# Patient Record
Sex: MALE | Race: WHITE | Employment: FULL TIME | URBAN - METROPOLITAN AREA
[De-identification: names, ages, dates, MRNs, and addresses within clinical notes are randomized per-mention and may not be internally consistent; named-entity substitution may affect disease eponyms.]

---

## 2017-02-26 DIAGNOSIS — J30.9 ALLERGIC RHINITIS, UNSPECIFIED ALLERGIC RHINITIS TRIGGER, UNSPECIFIED RHINITIS SEASONALITY: Primary | ICD-10-CM

## 2017-02-27 RX ORDER — LEVOCETIRIZINE DIHYDROCHLORIDE 5 MG/1
TABLET, FILM COATED ORAL
Qty: 30 TABLET | Refills: 3 | Status: SHIPPED | OUTPATIENT
Start: 2017-02-27 | End: 2017-06-27

## 2017-06-17 ENCOUNTER — OFFICE VISIT (OUTPATIENT)
Dept: FAMILY MEDICINE CLINIC | Facility: CLINIC | Age: 42
End: 2017-06-17

## 2017-06-17 VITALS
TEMPERATURE: 100 F | SYSTOLIC BLOOD PRESSURE: 110 MMHG | OXYGEN SATURATION: 98 % | WEIGHT: 280 LBS | DIASTOLIC BLOOD PRESSURE: 80 MMHG | HEART RATE: 110 BPM | BODY MASS INDEX: 40.09 KG/M2 | HEIGHT: 70 IN

## 2017-06-17 DIAGNOSIS — J01.00 ACUTE NON-RECURRENT MAXILLARY SINUSITIS: ICD-10-CM

## 2017-06-17 DIAGNOSIS — R05.9 COUGH: Primary | ICD-10-CM

## 2017-06-17 PROCEDURE — 99213 OFFICE O/P EST LOW 20 MIN: CPT | Performed by: FAMILY MEDICINE

## 2017-06-17 RX ORDER — BENZONATATE 100 MG/1
200 CAPSULE ORAL 3 TIMES DAILY PRN
Qty: 30 CAPSULE | Refills: 1 | Status: SHIPPED | OUTPATIENT
Start: 2017-06-17 | End: 2018-05-25 | Stop reason: ALTCHOICE

## 2017-06-17 RX ORDER — PREDNISONE 20 MG/1
TABLET ORAL
Qty: 10 TABLET | Refills: 0 | Status: SHIPPED | OUTPATIENT
Start: 2017-06-17 | End: 2018-05-25 | Stop reason: ALTCHOICE

## 2017-06-17 RX ORDER — CEFDINIR 300 MG/1
300 CAPSULE ORAL 2 TIMES DAILY
Qty: 20 CAPSULE | Refills: 0 | Status: SHIPPED | OUTPATIENT
Start: 2017-06-17 | End: 2018-05-25 | Stop reason: ALTCHOICE

## 2017-06-17 NOTE — PATIENT INSTRUCTIONS
Take antibiotics with food and plenty of water. Eat yogurt or take probiotic daily. (Ok Maria Teresa is a good example of an OTC probiotic)  Make sure to finish the entire antibiotic treatment.     Use inhaler for spasmodic cough or shortness of breath-- 2 puffs ev

## 2017-06-17 NOTE — PROGRESS NOTES
CHIEF COMPLAINT:   Patient presents with:  Cough/URI: cough for 3 days, fever since day 1-- 99.5 at max-- 99.3 this am. cough dry, but productive this am. thick yellow mucous. couldn't breathe well last night when lying down. nasal congestion, sore throat. reviewed. No pertinent past medical history.    Social History:    Smoking Status: Never Smoker                      Smokeless Status: Never Used                        Alcohol Use: Yes           0.0 - 0.6 oz/week       0-1 Standard drinks or equivalent per benzonatate (TESSALON PERLES) 100 MG Oral Cap 30 capsule 1      Sig: Take 2 capsules (200 mg total) by mouth 3 (three) times daily as needed for cough. Patient Instructions   Take antibiotics with food and plenty of water.    Eat yogurt or take

## 2017-06-27 ENCOUNTER — OFFICE VISIT (OUTPATIENT)
Dept: FAMILY MEDICINE CLINIC | Facility: CLINIC | Age: 42
End: 2017-06-27

## 2017-06-27 VITALS
BODY MASS INDEX: 40.37 KG/M2 | HEIGHT: 70.5 IN | HEART RATE: 88 BPM | TEMPERATURE: 98 F | WEIGHT: 285.19 LBS | SYSTOLIC BLOOD PRESSURE: 126 MMHG | DIASTOLIC BLOOD PRESSURE: 56 MMHG | RESPIRATION RATE: 18 BRPM

## 2017-06-27 DIAGNOSIS — J45.20 EXTRINSIC ASTHMA, MILD INTERMITTENT, UNCOMPLICATED: ICD-10-CM

## 2017-06-27 DIAGNOSIS — J01.00 ACUTE MAXILLARY SINUSITIS, RECURRENCE NOT SPECIFIED: Primary | ICD-10-CM

## 2017-06-27 DIAGNOSIS — J30.9 ALLERGIC RHINITIS, UNSPECIFIED ALLERGIC RHINITIS TRIGGER, UNSPECIFIED RHINITIS SEASONALITY: ICD-10-CM

## 2017-06-27 DIAGNOSIS — J20.9 ACUTE BRONCHITIS, UNSPECIFIED ORGANISM: ICD-10-CM

## 2017-06-27 PROCEDURE — 99213 OFFICE O/P EST LOW 20 MIN: CPT | Performed by: FAMILY MEDICINE

## 2017-06-27 RX ORDER — MOMETASONE 50 UG/1
2 SPRAY, METERED NASAL DAILY
Qty: 1 INHALER | Refills: 5 | Status: SHIPPED | OUTPATIENT
Start: 2017-06-27 | End: 2018-05-25

## 2017-06-27 RX ORDER — AZITHROMYCIN 250 MG/1
TABLET, FILM COATED ORAL
Qty: 6 TABLET | Refills: 0 | Status: SHIPPED | OUTPATIENT
Start: 2017-06-27 | End: 2018-05-25 | Stop reason: ALTCHOICE

## 2017-06-27 RX ORDER — LEVOCETIRIZINE DIHYDROCHLORIDE 5 MG/1
5 TABLET, FILM COATED ORAL
Qty: 30 TABLET | Refills: 5 | Status: SHIPPED | OUTPATIENT
Start: 2017-06-27 | End: 2018-02-08

## 2017-06-27 RX ORDER — ALBUTEROL SULFATE 90 UG/1
2 AEROSOL, METERED RESPIRATORY (INHALATION) EVERY 4 HOURS PRN
Qty: 1 INHALER | Refills: 6 | Status: SHIPPED | OUTPATIENT
Start: 2017-06-27 | End: 2019-11-11

## 2017-06-27 NOTE — PROGRESS NOTES
Keo Atkinson is a 35year old male. S:  Patient presents today with the following concerns:  · Patient complains of cough and congestion. Had albuterol inhaler at home. Used that and thought helped with cough spasm.   Liz wilsno helped with cou of breath with exertion  CARDIOVASCULAR: denies chest pain on exertion  GI: denies abdominal pain.   No N/V/D/C  : denies dysuria  MUSCULOSKELETAL: denies back pain  NEURO: denies headaches    EXAM:  /56 (BP Location: Left arm, Patient Position: Sit Recommend Z-josefina as directed. Start the prednisone that was given at the [facility]. Refilled Albuterol, levocetirizine, Nasonex. Encouraged fluids, rest, and steam.    Follow up if symptoms worsen or do not improve.

## 2017-09-25 ENCOUNTER — TELEPHONE (OUTPATIENT)
Dept: FAMILY MEDICINE CLINIC | Facility: CLINIC | Age: 42
End: 2017-09-25

## 2018-02-08 ENCOUNTER — TELEPHONE (OUTPATIENT)
Dept: FAMILY MEDICINE CLINIC | Facility: CLINIC | Age: 43
End: 2018-02-08

## 2018-02-08 RX ORDER — LEVOCETIRIZINE DIHYDROCHLORIDE 5 MG/1
5 TABLET, FILM COATED ORAL
Qty: 30 TABLET | Refills: 0 | Status: SHIPPED | OUTPATIENT
Start: 2018-02-08 | End: 2018-05-25

## 2018-02-09 ENCOUNTER — TELEPHONE (OUTPATIENT)
Dept: FAMILY MEDICINE CLINIC | Facility: CLINIC | Age: 43
End: 2018-02-09

## 2018-02-09 NOTE — TELEPHONE ENCOUNTER
Eloy sent fax stating Parveen Schaffer is on back order. Is there a alternate med you would temporarily order in it's place ? Routed to [employer]

## 2018-02-11 NOTE — TELEPHONE ENCOUNTER
I would just have him take Zyrtec 10 mg once daily til available. He could also check other pharmacies.

## 2018-02-13 NOTE — TELEPHONE ENCOUNTER
Called and talked to patient informed him of back order and what to take until this is available again

## 2018-02-14 NOTE — TELEPHONE ENCOUNTER
Called patient and left message that he is due for an asthma follow up and to contact the office to schedule

## 2018-05-25 ENCOUNTER — OFFICE VISIT (OUTPATIENT)
Dept: FAMILY MEDICINE CLINIC | Facility: CLINIC | Age: 43
End: 2018-05-25

## 2018-05-25 VITALS
WEIGHT: 289.19 LBS | TEMPERATURE: 99 F | BODY MASS INDEX: 41.4 KG/M2 | OXYGEN SATURATION: 98 % | DIASTOLIC BLOOD PRESSURE: 86 MMHG | SYSTOLIC BLOOD PRESSURE: 128 MMHG | HEIGHT: 70 IN | HEART RATE: 86 BPM

## 2018-05-25 DIAGNOSIS — J01.40 ACUTE NON-RECURRENT PANSINUSITIS: Primary | ICD-10-CM

## 2018-05-25 DIAGNOSIS — J30.1 NON-SEASONAL ALLERGIC RHINITIS DUE TO POLLEN: ICD-10-CM

## 2018-05-25 PROCEDURE — 99214 OFFICE O/P EST MOD 30 MIN: CPT | Performed by: FAMILY MEDICINE

## 2018-05-25 RX ORDER — AMOXICILLIN AND CLAVULANATE POTASSIUM 875; 125 MG/1; MG/1
1 TABLET, FILM COATED ORAL 2 TIMES DAILY
Qty: 20 TABLET | Refills: 0 | Status: SHIPPED | OUTPATIENT
Start: 2018-05-25 | End: 2018-06-04

## 2018-05-25 RX ORDER — MOMETASONE 50 UG/1
2 SPRAY, METERED NASAL DAILY
Qty: 1 INHALER | Refills: 5 | Status: SHIPPED | OUTPATIENT
Start: 2018-05-25 | End: 2019-11-11

## 2018-05-25 RX ORDER — PREDNISONE 20 MG/1
40 TABLET ORAL DAILY
Qty: 10 TABLET | Refills: 0 | Status: SHIPPED | OUTPATIENT
Start: 2018-05-25 | End: 2018-05-30

## 2018-05-25 RX ORDER — LEVOCETIRIZINE DIHYDROCHLORIDE 5 MG/1
5 TABLET, FILM COATED ORAL
Qty: 30 TABLET | Refills: 5 | Status: SHIPPED | OUTPATIENT
Start: 2018-05-25 | End: 2019-04-23

## 2018-05-25 NOTE — PROGRESS NOTES
Chief Complaint:  Patient presents with:  Cough: Feeling run down for over a week with a cough keeping him awake at night,facial sinus pressure. Lite yellow nasal drainage. Tight chest from cough. SOB lying down.   Asthma: ACT Score 22/25             AAP upd Smokeless tobacco: Never Used                      Alcohol use: Yes           0.6 oz/week     Glasses of wine: 1 per week     Comment: 2-3 drinks a month         Current Outpatient Prescriptions:  Mometasone Furoate (NASONEX) 50 MCG/ACT Nasal Suspensio 875-125 MG Oral Tab    Non-seasonal allergic rhinitis due to pollen        Relevant Medications    Mometasone Furoate (NASONEX) 50 MCG/ACT Nasal Suspension    Levocetirizine Dihydrochloride 5 MG Oral Tab    predniSONE 20 MG Oral Tab          Advised the fo

## 2018-09-12 ENCOUNTER — OFFICE VISIT (OUTPATIENT)
Dept: FAMILY MEDICINE CLINIC | Facility: CLINIC | Age: 43
End: 2018-09-12
Payer: COMMERCIAL

## 2018-09-12 VITALS
SYSTOLIC BLOOD PRESSURE: 122 MMHG | RESPIRATION RATE: 16 BRPM | TEMPERATURE: 100 F | DIASTOLIC BLOOD PRESSURE: 80 MMHG | HEART RATE: 98 BPM

## 2018-09-12 DIAGNOSIS — M10.9 ACUTE GOUT OF RIGHT FOOT, UNSPECIFIED CAUSE: Primary | ICD-10-CM

## 2018-09-12 PROCEDURE — 99213 OFFICE O/P EST LOW 20 MIN: CPT | Performed by: NURSE PRACTITIONER

## 2018-09-12 RX ORDER — PREDNISONE 10 MG/1
10 TABLET ORAL DAILY
Qty: 27 TABLET | Refills: 0 | Status: SHIPPED | OUTPATIENT
Start: 2018-09-12 | End: 2018-10-13 | Stop reason: ALTCHOICE

## 2018-09-12 NOTE — PATIENT INSTRUCTIONS
Prednisone 10mg tabs- take as below (take all tabs for the day in the morning, at the same time): Take 4 tabs daily for 3 days, take 3 tabs daily for 3 days, take 2 tabs daily for 2 days, take 1 tab daily for 2 days.     Do not take any Adv increased risk for a gout attack, but recent studies show that high purine vegetables don't increase the risk for a gout attack. Eat more of these foods  Other foods may be helpful for people with gout.  Add some of these foods to your diet:  · Cherries co

## 2018-09-12 NOTE — PROGRESS NOTES
Patient presents with: Foot Pain: on Right foot, cannot put weight on it, has an appt with podiatrist tomorrow       HPI:  Presents with approx 4 day history of right foot redness, swelling and pain over great toe joint. Denies known injury to foot.  Lio Brian Ext: right foot over first metatarsophalangeal joint erythematous, edematous and TTP. No open lesions or sores. No streaking of redness up foot. Limited ROM due to pain.      A/P:    Acute gout of right foot, unspecified cause  (primary encounter diagnosis) Weight loss for those who are overweight may help reduce gout attacks. Eat less of these foods  Eating too many foods containing purines may raise the levels of uric acid in your body. This raises your risk for a gout attack.  Try to limit these foods and · Fever of 100.4°F (38°C) or higher  · Pain that doesn't get better even with prescribed medicine   Date Last Reviewed: 1/12/2016  © 7318-7287 [employer]. [address] [facility], [address] [place] [address]. All rights reserved.  This information is no

## 2018-10-13 ENCOUNTER — OFFICE VISIT (OUTPATIENT)
Dept: FAMILY MEDICINE CLINIC | Facility: CLINIC | Age: 43
End: 2018-10-13
Payer: COMMERCIAL

## 2018-10-13 VITALS
HEART RATE: 80 BPM | BODY MASS INDEX: 40.49 KG/M2 | DIASTOLIC BLOOD PRESSURE: 84 MMHG | RESPIRATION RATE: 16 BRPM | SYSTOLIC BLOOD PRESSURE: 130 MMHG | TEMPERATURE: 99 F | WEIGHT: 286 LBS | HEIGHT: 70.5 IN

## 2018-10-13 DIAGNOSIS — M10.9 ACUTE GOUT OF RIGHT FOOT, UNSPECIFIED CAUSE: Primary | ICD-10-CM

## 2018-10-13 DIAGNOSIS — J01.40 ACUTE NON-RECURRENT PANSINUSITIS: ICD-10-CM

## 2018-10-13 DIAGNOSIS — R05.9 COUGH: ICD-10-CM

## 2018-10-13 PROCEDURE — 99213 OFFICE O/P EST LOW 20 MIN: CPT | Performed by: FAMILY MEDICINE

## 2018-10-13 RX ORDER — AMOXICILLIN AND CLAVULANATE POTASSIUM 875; 125 MG/1; MG/1
1 TABLET, FILM COATED ORAL 2 TIMES DAILY
Qty: 14 TABLET | Refills: 0 | Status: SHIPPED | OUTPATIENT
Start: 2018-10-13 | End: 2018-10-20

## 2018-10-13 NOTE — PATIENT INSTRUCTIONS
Gout Diet  Gout is a painful condition caused by an excess of uric acid, a waste product made by the body. Uric acid forms crystals that collect in the joints. The immune response to these crystals brings on symptoms of joint pain and swelling.  This is c · Dairy products that are low-fat or fat-free, such as cheese and yogurt  · Complex carbohydrate foods, including whole grains, brown rice, oats, and beans  · Coffee, in moderation  · Water, approximately 64 ounces per day  Follow-up care  Follow up with carlos a

## 2018-10-13 NOTE — PROGRESS NOTES
Patient presents with:  Cough: pain in side when cough x 1 week   Gout     HPI:   Gale Bryan is a 37year old male who presents to the office for eval of cough, and a new diagnosis of gout. Cough - started after gout.   Present about a week and a h times daily for 7 days. Dispense: 14 tablet; Refill: 0    3. Cough      Nyla Hendrickson M.D.   [facility] 3  10/13/18    Labs: was on steroids during lab draw.

## 2018-10-24 ENCOUNTER — TELEPHONE (OUTPATIENT)
Dept: FAMILY MEDICINE CLINIC | Facility: CLINIC | Age: 43
End: 2018-10-24

## 2018-10-24 RX ORDER — LEVOFLOXACIN 750 MG/1
750 TABLET ORAL DAILY
Qty: 7 TABLET | Refills: 0 | Status: SHIPPED | OUTPATIENT
Start: 2018-10-24 | End: 2018-10-31

## 2018-10-24 NOTE — TELEPHONE ENCOUNTER
Pt wife called stating Pt continues with cough, headache, sinus pressure, teeth pain and snoring. Pt was Dx with sinus infection on 10/13/18- started on Augmentin. Pt symptoms improved some while on antibiotics but now symptoms back completely.  Pt taking

## 2018-10-24 NOTE — TELEPHONE ENCOUNTER
Pt was seen on 10/13/18 by Dr Ana Treviño  and was prescribed Augmentin. Pt still having symptoms.  [facility]

## 2018-10-24 NOTE — TELEPHONE ENCOUNTER
Lets try a different antibiotic to get these symptoms under better control  Levaquin x 7 days  ordered

## 2019-01-07 ENCOUNTER — OFFICE VISIT (OUTPATIENT)
Dept: FAMILY MEDICINE CLINIC | Facility: CLINIC | Age: 44
End: 2019-01-07
Payer: COMMERCIAL

## 2019-01-07 VITALS
HEART RATE: 84 BPM | RESPIRATION RATE: 16 BRPM | WEIGHT: 284.19 LBS | BODY MASS INDEX: 39.79 KG/M2 | DIASTOLIC BLOOD PRESSURE: 88 MMHG | SYSTOLIC BLOOD PRESSURE: 128 MMHG | TEMPERATURE: 99 F | HEIGHT: 71 IN

## 2019-01-07 DIAGNOSIS — M10.9 ACUTE GOUT OF LEFT FOOT, UNSPECIFIED CAUSE: Primary | ICD-10-CM

## 2019-01-07 PROCEDURE — 99213 OFFICE O/P EST LOW 20 MIN: CPT | Performed by: FAMILY MEDICINE

## 2019-01-07 RX ORDER — PREDNISONE 10 MG/1
TABLET ORAL
Qty: 27 TABLET | Refills: 0 | Status: SHIPPED | OUTPATIENT
Start: 2019-01-07 | End: 2019-11-11

## 2019-01-07 NOTE — PATIENT INSTRUCTIONS
Gout Diet  Gout is a painful condition caused by an excess of uric acid, a waste product made by the body. Uric acid forms crystals that collect in the joints. The immune response to these crystals brings on symptoms of joint pain and swelling.  This is c · Dairy products that are low-fat or fat-free, such as cheese and yogurt  · Complex carbohydrate foods, including whole grains, brown rice, oats, and beans  · Coffee, in moderation  · Water, approximately 64 ounces per day  Follow-up care  Follow up with carlos a · Certain fish (anchovies, sardines, fish roes, herring, tuna, mussels, codfish, scallops, trout, and shayy)  · Certain meats (red meat, processed meat, hart, turkey, wild game, and goose)  · Sauces and gravies made with meat  · Organ meats (such as kayla Gout is an inflammation of a joint due to a build-up of gout crystals in the joint fluid. This occurs when there is an excess of uric acid (a normal waste product) in the body.  Uric acid builds up in the body when the kidneys are unable to filter enough of · If pain medicines have been prescribed, take them exactly as directed.    Preventing attacks  · Minimize or avoid alcohol use. Excess alcohol intake can cause a gout attack. · Limit these foods and beverages:  ? Organ meats, such as kidneys and liver  ?

## 2019-01-08 NOTE — PROGRESS NOTES
Zhou Zuniga is a 37year old male. S:  Patient presents today with the following concerns:  · Symptoms began 4 days with left foot pain, swelling, redness and heat. Feels like a gouty attack to him. Has had a few the past several months.   Unable t denies shortness of breath with exertion  CARDIOVASCULAR: denies chest pain on exertion  GI: denies abdominal pain.   No N/V/D/C  : denies dysuria  MUSCULOSKELETAL: denies back pain  NEURO: denies headaches    EXAM:  /88   Pulse 84   Temp 98.9 °F (3

## 2019-04-23 DIAGNOSIS — J30.1 NON-SEASONAL ALLERGIC RHINITIS DUE TO POLLEN: ICD-10-CM

## 2019-04-24 RX ORDER — LEVOCETIRIZINE DIHYDROCHLORIDE 5 MG/1
TABLET, FILM COATED ORAL
Qty: 30 TABLET | Refills: 5 | Status: SHIPPED | OUTPATIENT
Start: 2019-04-24 | End: 2019-11-11

## 2019-11-11 ENCOUNTER — OFFICE VISIT (OUTPATIENT)
Dept: FAMILY MEDICINE CLINIC | Facility: CLINIC | Age: 44
End: 2019-11-11
Payer: COMMERCIAL

## 2019-11-11 VITALS
BODY MASS INDEX: 41.05 KG/M2 | TEMPERATURE: 98 F | HEART RATE: 84 BPM | RESPIRATION RATE: 16 BRPM | DIASTOLIC BLOOD PRESSURE: 88 MMHG | WEIGHT: 290 LBS | SYSTOLIC BLOOD PRESSURE: 128 MMHG | HEIGHT: 70.5 IN

## 2019-11-11 DIAGNOSIS — J45.20 EXTRINSIC ASTHMA, MILD INTERMITTENT, UNCOMPLICATED: ICD-10-CM

## 2019-11-11 DIAGNOSIS — R79.89 LOW TESTOSTERONE: ICD-10-CM

## 2019-11-11 DIAGNOSIS — J01.10 ACUTE NON-RECURRENT FRONTAL SINUSITIS: Primary | ICD-10-CM

## 2019-11-11 DIAGNOSIS — J30.1 NON-SEASONAL ALLERGIC RHINITIS DUE TO POLLEN: ICD-10-CM

## 2019-11-11 DIAGNOSIS — E55.9 VITAMIN D DEFICIENCY: ICD-10-CM

## 2019-11-11 DIAGNOSIS — E78.2 MIXED HYPERLIPIDEMIA: ICD-10-CM

## 2019-11-11 PROCEDURE — 99214 OFFICE O/P EST MOD 30 MIN: CPT | Performed by: FAMILY MEDICINE

## 2019-11-11 RX ORDER — ALBUTEROL SULFATE 90 UG/1
2 AEROSOL, METERED RESPIRATORY (INHALATION) EVERY 4 HOURS PRN
Qty: 1 INHALER | Refills: 6 | Status: SHIPPED | OUTPATIENT
Start: 2019-11-11 | End: 2021-10-27

## 2019-11-11 RX ORDER — AMOXICILLIN AND CLAVULANATE POTASSIUM 875; 125 MG/1; MG/1
1 TABLET, FILM COATED ORAL 2 TIMES DAILY
Qty: 20 TABLET | Refills: 0 | Status: SHIPPED | OUTPATIENT
Start: 2019-11-11 | End: 2019-11-21

## 2019-11-11 RX ORDER — ERGOCALCIFEROL 1.25 MG/1
50000 CAPSULE ORAL WEEKLY
Qty: 12 CAPSULE | Refills: 1 | Status: SHIPPED | OUTPATIENT
Start: 2019-11-11 | End: 2019-12-11

## 2019-11-11 RX ORDER — MOMETASONE 50 UG/1
2 SPRAY, METERED NASAL DAILY
Qty: 1 INHALER | Refills: 11 | Status: SHIPPED | OUTPATIENT
Start: 2019-11-11 | End: 2021-08-05

## 2019-11-11 RX ORDER — LEVOCETIRIZINE DIHYDROCHLORIDE 5 MG/1
TABLET, FILM COATED ORAL
Qty: 30 TABLET | Refills: 11 | Status: SHIPPED | OUTPATIENT
Start: 2019-11-11 | End: 2021-08-05

## 2019-11-11 NOTE — PROGRESS NOTES
Eran Santizo is a 53year old male.     S:  Patient presents today with the following concerns:  Sinus Problem (x 4 weeks, sinus congestion pressure getting worse)   Cough (dry cough)   Allergies (needs follow up for allergy medication refills)     · Fro N/V/D/C  : denies dysuria  MUSCULOSKELETAL: denies back pain  NEURO:see above    EXAM:  /88   Pulse 84   Temp 98.1 °F (36.7 °C) (Oral)   Resp 16   Ht 70.5\"   Wt 290 lb (131.5 kg)   BMI 41.02 kg/m²   GENERAL: well developed, well nourished,in no ap (INTERNAL)      Meds refilled as above. Start Augmentin for sinusitis. Restart allergy meds. Increase fluids, steam, rest.  Sinus rinses or saline nasal sprays or netti pot. Tea with honey. Warm compresses to the sinuses. May use Sudafed if no HTN.

## 2019-12-14 ENCOUNTER — TELEPHONE (OUTPATIENT)
Dept: FAMILY MEDICINE CLINIC | Facility: CLINIC | Age: 44
End: 2019-12-14

## 2019-12-14 NOTE — TELEPHONE ENCOUNTER
Wife called back explained that I sent message to [facility] Mellisa.  If she answers by end of day I will call back otherwise will call on Monday

## 2019-12-14 NOTE — TELEPHONE ENCOUNTER
Patient's wife is calling stating patient has completed course of antibiotics and today patient is feeling worse. Symptoms have not cleared and would like to know what to do.

## 2019-12-14 NOTE — TELEPHONE ENCOUNTER
I saw him 11/11/19. Did the symptoms go away and then return OR have symptoms continued? Please call patient to clarify.

## 2019-12-16 RX ORDER — BENZONATATE 200 MG/1
200 CAPSULE ORAL 3 TIMES DAILY PRN
Qty: 30 CAPSULE | Refills: 0 | Status: SHIPPED | OUTPATIENT
Start: 2019-12-16 | End: 2021-08-05

## 2019-12-16 RX ORDER — DOXYCYCLINE HYCLATE 100 MG
100 TABLET ORAL 2 TIMES DAILY
Qty: 20 TABLET | Refills: 0 | Status: SHIPPED | OUTPATIENT
Start: 2019-12-16 | End: 2020-11-06

## 2019-12-16 NOTE — TELEPHONE ENCOUNTER
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

## 2019-12-18 ENCOUNTER — HOSPITAL ENCOUNTER (OUTPATIENT)
Dept: CT IMAGING | Facility: HOSPITAL | Age: 44
Discharge: HOME | End: 2019-12-18
Attending: FAMILY MEDICINE
Payer: COMMERCIAL

## 2019-12-18 ENCOUNTER — OFFICE VISIT (OUTPATIENT)
Dept: FAMILY MEDICINE CLINIC | Facility: CLINIC | Age: 44
End: 2019-12-18
Payer: COMMERCIAL

## 2019-12-18 VITALS
HEART RATE: 84 BPM | TEMPERATURE: 99 F | WEIGHT: 286.19 LBS | BODY MASS INDEX: 40.97 KG/M2 | HEIGHT: 70 IN | DIASTOLIC BLOOD PRESSURE: 80 MMHG | RESPIRATION RATE: 16 BRPM | SYSTOLIC BLOOD PRESSURE: 138 MMHG

## 2019-12-18 DIAGNOSIS — N20.1 RIGHT URETERAL STONE: ICD-10-CM

## 2019-12-18 DIAGNOSIS — R10.9 ACUTE RIGHT FLANK PAIN: ICD-10-CM

## 2019-12-18 DIAGNOSIS — R31.29 MICROSCOPIC HEMATURIA: ICD-10-CM

## 2019-12-18 DIAGNOSIS — R35.0 URINARY FREQUENCY: ICD-10-CM

## 2019-12-18 DIAGNOSIS — R10.9 ACUTE RIGHT FLANK PAIN: Primary | ICD-10-CM

## 2019-12-18 PROBLEM — Q66.229 METATARSUS ADDUCTUS: Status: ACTIVE | Noted: 2019-12-18

## 2019-12-18 PROBLEM — M19.079 OSTEOARTHRITIS OF FOOT JOINT: Status: ACTIVE | Noted: 2019-12-18

## 2019-12-18 PROCEDURE — 99214 OFFICE O/P EST MOD 30 MIN: CPT | Performed by: FAMILY MEDICINE

## 2019-12-18 PROCEDURE — 81003 URINALYSIS AUTO W/O SCOPE: CPT | Performed by: FAMILY MEDICINE

## 2019-12-18 PROCEDURE — 74176 CT ABD & PELVIS W/O CONTRAST: CPT | Performed by: FAMILY MEDICINE

## 2019-12-18 RX ORDER — TAMSULOSIN HYDROCHLORIDE 0.4 MG/1
0.4 CAPSULE ORAL DAILY
Qty: 30 CAPSULE | Refills: 0 | Status: SHIPPED | OUTPATIENT
Start: 2019-12-18 | End: 2020-01-17

## 2019-12-18 RX ORDER — HYDROCODONE BITARTRATE AND ACETAMINOPHEN 5; 325 MG/1; MG/1
1 TABLET ORAL EVERY 6 HOURS PRN
Qty: 10 TABLET | Refills: 0 | Status: SHIPPED | OUTPATIENT
Start: 2019-12-18 | End: 2021-08-05

## 2019-12-18 NOTE — PATIENT INSTRUCTIONS
Preventing Kidney Stones  If you’ve had a kidney stone, you may worry that you’ll have another. Removing or passing your stone doesn’t prevent future stones. But with your healthcare provider’s help, you can reduce your risk of forming new stones.  Follow For uric acid stones: Limit high-purine foods, such as mushrooms, peas, beans, anchovies, meat, poultry, shellfish, and organ meats. These foods increase uric acid production.   For cystine stones: Limit high-methionine foods (fish is the most common, but e Follow up with your healthcare provider  Follow up by taking any stones you find to your provider for analysis. The type of stone you have determines your diet and prevention program. You may need more tests in the future.  These tests will ensure that new Follow up by taking any stones you find to your provider for analysis. The type of stone you have determines your diet and prevention program. You may need more tests in the future. These tests will ensure that new stones are not forming.   Date Last Review

## 2019-12-18 NOTE — PROGRESS NOTES
Stephen Hayes is a 53year old male. S:  Patient presents today with the following concerns:  Cough (Currently on antibiotic and cough medication)   Flank Pain (R intense flank pain. beginning at 2:30a.m. this morning. Managing pain with ibuprofen.  Henry joint     Metatarsus adductus    Family History   Problem Relation Age of Onset   • Arthritis Maternal Grandmother    • Arthritis Mother    • Diabetes Maternal Grandmother    • Diabetes Mother        REVIEW OF SYSTEMS:  GENERAL: feels well otherwise  SKIN: 2-3 ibuprofen and 2 tylenol at the same time for pain relief. Small quantity of Norco given to take only if needed. Push fluids. Urine strainer given to him. Strain urine every time so can see if passes the stone. Will send stone to path if passes.   I

## 2019-12-19 ENCOUNTER — TELEPHONE (OUTPATIENT)
Dept: FAMILY MEDICINE CLINIC | Facility: CLINIC | Age: 44
End: 2019-12-19

## 2019-12-19 NOTE — TELEPHONE ENCOUNTER
Please call patient to see how he is doing this afternoon. Kidney stone diagnosed yesterday. On Flomax and straining urine.

## 2019-12-20 NOTE — TELEPHONE ENCOUNTER
Patient has not passed stone yet. Tolerating pain with the help of ibuprofen and APAP. Encouraged patient to continue Flomax and straining urine. Take pain meds PRN. If pain increases pt should go to ER. He verbalizes understanding.     Routed to [facility] No

## 2019-12-27 ENCOUNTER — TELEPHONE (OUTPATIENT)
Dept: FAMILY MEDICINE CLINIC | Facility: CLINIC | Age: 44
End: 2019-12-27

## 2019-12-27 DIAGNOSIS — N20.0 KIDNEY STONE: Primary | ICD-10-CM

## 2019-12-28 NOTE — TELEPHONE ENCOUNTER
Called and talked to patient he has passed one stone and has it in a jar at home. Wife brought it in but no one knew what to do with it. He is now pain free and wants to stop straining until he startes having pain again.  I told this was ok and I would ask

## 2019-12-28 NOTE — TELEPHONE ENCOUNTER
Placed order for pathology to examine stone then called patient and told him OK to stop straining urine and to take stone to lab that order was already in for this

## 2020-08-24 ENCOUNTER — TELEPHONE (OUTPATIENT)
Dept: FAMILY MEDICINE CLINIC | Facility: CLINIC | Age: 45
End: 2020-08-24

## 2020-08-24 DIAGNOSIS — J30.1 SEASONAL ALLERGIC RHINITIS DUE TO POLLEN: Primary | ICD-10-CM

## 2020-08-24 NOTE — TELEPHONE ENCOUNTER
Patient's wife is calling he needs a note for work stating he has chronic allergies/congestion before they will let him go to work on Thursday, 8/27/20. Please call for details.

## 2020-09-22 ENCOUNTER — TELEPHONE (OUTPATIENT)
Dept: FAMILY MEDICINE CLINIC | Facility: CLINIC | Age: 45
End: 2020-09-22

## 2020-09-22 NOTE — TELEPHONE ENCOUNTER
Pt had flu shot and ever since he hasnt felt right. He is getting a little worse with the symptoms. He has some dizziness and headache. Wife wondering if it is something they should be concerned about.

## 2020-09-22 NOTE — TELEPHONE ENCOUNTER
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  I told the wife to have him drink lots of fluids and take tylenol if not better tomorrow might need to

## 2020-11-03 ENCOUNTER — VIRTUAL PHONE E/M (OUTPATIENT)
Dept: FAMILY MEDICINE CLINIC | Facility: CLINIC | Age: 45
End: 2020-11-03
Payer: COMMERCIAL

## 2020-11-03 DIAGNOSIS — Z20.822 ENCOUNTER FOR SCREENING LABORATORY TESTING FOR COVID-19 VIRUS: ICD-10-CM

## 2020-11-03 DIAGNOSIS — R09.81 NASAL CONGESTION: Primary | ICD-10-CM

## 2020-11-03 PROCEDURE — 99213 OFFICE O/P EST LOW 20 MIN: CPT | Performed by: PHYSICIAN ASSISTANT

## 2020-11-03 NOTE — PROGRESS NOTES
Virtual Telephone Check-In    Elva Turner verbally consents to a Virtual/Telephone Check-In visit on 11/03/20. Patient has been referred to the [facility] website at [email or web address] to review the yearly Consent to Treat document.     Patient underst

## 2020-11-04 ENCOUNTER — LAB ENCOUNTER (OUTPATIENT)
Dept: LAB | Facility: HOSPITAL | Age: 45
End: 2020-11-04
Attending: PHYSICIAN ASSISTANT
Payer: COMMERCIAL

## 2020-11-04 DIAGNOSIS — R09.81 SINUS CONGESTION: Primary | ICD-10-CM

## 2021-02-11 DIAGNOSIS — Z23 NEED FOR VACCINATION: ICD-10-CM

## 2021-02-14 ENCOUNTER — IMMUNIZATION (OUTPATIENT)
Dept: LAB | Age: 46
End: 2021-02-14
Attending: HOSPITALIST
Payer: COMMERCIAL

## 2021-02-14 DIAGNOSIS — Z23 NEED FOR VACCINATION: Primary | ICD-10-CM

## 2021-02-14 PROCEDURE — 0001A SARSCOV2 VAC 30MCG/0.3ML IM: CPT

## 2021-03-07 ENCOUNTER — IMMUNIZATION (OUTPATIENT)
Dept: LAB | Age: 46
End: 2021-03-07
Attending: HOSPITALIST
Payer: COMMERCIAL

## 2021-03-07 DIAGNOSIS — Z23 NEED FOR VACCINATION: Primary | ICD-10-CM

## 2021-03-07 PROCEDURE — 0002A SARSCOV2 VAC 30MCG/0.3ML IM: CPT

## 2021-08-05 ENCOUNTER — OFFICE VISIT (OUTPATIENT)
Dept: INTERNAL MEDICINE CLINIC | Facility: CLINIC | Age: 46
End: 2021-08-05
Payer: COMMERCIAL

## 2021-08-05 VITALS
SYSTOLIC BLOOD PRESSURE: 112 MMHG | TEMPERATURE: 98 F | BODY MASS INDEX: 40.12 KG/M2 | WEIGHT: 293 LBS | HEIGHT: 71.65 IN | HEART RATE: 96 BPM | DIASTOLIC BLOOD PRESSURE: 82 MMHG

## 2021-08-05 DIAGNOSIS — E66.9 CLASS 3 OBESITY: ICD-10-CM

## 2021-08-05 DIAGNOSIS — M1A.09X0 IDIOPATHIC CHRONIC GOUT OF MULTIPLE SITES WITHOUT TOPHUS: Primary | ICD-10-CM

## 2021-08-05 DIAGNOSIS — E78.5 HYPERLIPIDEMIA, UNSPECIFIED HYPERLIPIDEMIA TYPE: ICD-10-CM

## 2021-08-05 DIAGNOSIS — E88.81 METABOLIC SYNDROME: ICD-10-CM

## 2021-08-05 DIAGNOSIS — R73.03 PREDIABETES: ICD-10-CM

## 2021-08-05 PROCEDURE — 3074F SYST BP LT 130 MM HG: CPT | Performed by: INTERNAL MEDICINE

## 2021-08-05 PROCEDURE — 3008F BODY MASS INDEX DOCD: CPT | Performed by: INTERNAL MEDICINE

## 2021-08-05 PROCEDURE — 3079F DIAST BP 80-89 MM HG: CPT | Performed by: INTERNAL MEDICINE

## 2021-08-05 PROCEDURE — 99214 OFFICE O/P EST MOD 30 MIN: CPT | Performed by: INTERNAL MEDICINE

## 2021-08-05 RX ORDER — ALLOPURINOL 100 MG/1
100 TABLET ORAL DAILY
Qty: 90 TABLET | Refills: 0 | Status: SHIPPED | OUTPATIENT
Start: 2021-08-05 | End: 2021-11-07

## 2021-08-05 RX ORDER — MOMETASONE 50 UG/1
SPRAY, METERED NASAL DAILY
COMMUNITY

## 2021-08-05 RX ORDER — CETIRIZINE HYDROCHLORIDE 10 MG/1
10 TABLET ORAL DAILY
COMMUNITY
End: 2021-10-27

## 2021-08-05 NOTE — PROGRESS NOTES
Katie Parnell  7/25/1975    Patient presents with:  Establish Care: [address] 7 est care. talk about gout, prediabetic and sinus issue      SUBJECTIVE   Katie Parnell is a 48year old male who presents to [facility].     The patient has a history of dys Allergic rhinitis due to allergen     Hyperlipidemia     BMI 40.0-44.9, adult (HCC)     Osteoarthritis of foot joint     Metatarsus adductus     No past surgical history on file.    Social History    Tobacco Use      Smoking status: Never Smoker      Smokel allopurinol therapy at 100 mg daily will be prescribed. CMP and uric acid level to be completed in October during his work screening. Return in approximately 2 months for an annual physical examination.     The patient indicates understanding of these i

## 2021-09-21 ENCOUNTER — OFFICE VISIT (OUTPATIENT)
Dept: INTERNAL MEDICINE CLINIC | Facility: CLINIC | Age: 46
End: 2021-09-21
Payer: COMMERCIAL

## 2021-09-21 VITALS
BODY MASS INDEX: 41.48 KG/M2 | RESPIRATION RATE: 16 BRPM | HEIGHT: 70.5 IN | WEIGHT: 293 LBS | DIASTOLIC BLOOD PRESSURE: 82 MMHG | OXYGEN SATURATION: 98 % | SYSTOLIC BLOOD PRESSURE: 124 MMHG | HEART RATE: 97 BPM

## 2021-09-21 DIAGNOSIS — E66.01 MORBID OBESITY WITH BMI OF 40.0-44.9, ADULT (HCC): ICD-10-CM

## 2021-09-21 DIAGNOSIS — Z83.3 FAMILY HISTORY OF DIABETES MELLITUS IN MOTHER: ICD-10-CM

## 2021-09-21 DIAGNOSIS — R73.03 PREDIABETES: ICD-10-CM

## 2021-09-21 DIAGNOSIS — I51.7 LEFT VENTRICULAR HYPERTROPHY BY ELECTROCARDIOGRAM: ICD-10-CM

## 2021-09-21 DIAGNOSIS — Z51.81 THERAPEUTIC DRUG MONITORING: Primary | ICD-10-CM

## 2021-09-21 DIAGNOSIS — E88.81 METABOLIC SYNDROME: ICD-10-CM

## 2021-09-21 DIAGNOSIS — E78.5 HYPERLIPIDEMIA, UNSPECIFIED HYPERLIPIDEMIA TYPE: ICD-10-CM

## 2021-09-21 PROCEDURE — 3074F SYST BP LT 130 MM HG: CPT | Performed by: NURSE PRACTITIONER

## 2021-09-21 PROCEDURE — 3079F DIAST BP 80-89 MM HG: CPT | Performed by: NURSE PRACTITIONER

## 2021-09-21 PROCEDURE — 99204 OFFICE O/P NEW MOD 45 MIN: CPT | Performed by: NURSE PRACTITIONER

## 2021-09-21 PROCEDURE — 3008F BODY MASS INDEX DOCD: CPT | Performed by: NURSE PRACTITIONER

## 2021-09-21 NOTE — PATIENT INSTRUCTIONS
We are here to support you with weight loss, but please remember that you still need your primary care provider for your routine health maintenance.       PLAN:  Get 2 doppler done  Get blood work done with work and bring in printed copy  Follow up with me per day)                   ** Daily INPUT> Look at nutrition section-- \"nutrients\" and it will break down your macros for the day (ie. Protein, carbs, fibers, sugars and fats). Try to stay within these numbers daily     2.  \"7 minute workout\" to help wi

## 2021-09-21 NOTE — PROGRESS NOTES
HISTORY OF PRESENT ILLNESS  Patient presents with:  Weight Problem: referred by , never tried any weight loss meds, open to trying meds    Wallace Newsome is a 48year old male new to our office today for initiation of medical weight loss program. cheese Chicken breast, mashed potato, green beans, diet pepsi Small [employer] trail mix, yogurt cup Water: adequate   Soda:  Juice:  Coffee/Tea: coffee     Portion: medium  Eats 3 meals per day: no (might skip lunch)   Number of restaurant or fast food meals/ SpO2 98%   BMI 41.45 kg/m² , Percent body fat: Male 38.5%;  Muscle Mass: 19.0%   Waist Circumference: 51   GENERAL: well developed, well nourished, in no apparent distress  SKIN: warm, pink, dry without rashes to exposed area   EYES: conjunctiva pink, scle Prediabetes    (J44.93) Metabolic syndrome    Initial Weight Data and Goal Weight Loss:  Weight Calculations  Initial Weight: 293 lbs  Initial Weight Date: 09/21/21  Today's Weight: 293 lbs  5% Goal: 14.65  10% Goal: 29.3  Total Weight Loss: 0 lbs  Initial

## 2021-10-23 ENCOUNTER — HOSPITAL ENCOUNTER (OUTPATIENT)
Dept: CV DIAGNOSTICS | Facility: HOSPITAL | Age: 46
Discharge: HOME | End: 2021-10-23
Attending: NURSE PRACTITIONER
Payer: COMMERCIAL

## 2021-10-23 DIAGNOSIS — I51.7 LEFT VENTRICULAR HYPERTROPHY BY ELECTROCARDIOGRAM: ICD-10-CM

## 2021-10-23 DIAGNOSIS — E88.81 METABOLIC SYNDROME: ICD-10-CM

## 2021-10-23 DIAGNOSIS — Z51.81 THERAPEUTIC DRUG MONITORING: ICD-10-CM

## 2021-10-23 DIAGNOSIS — E78.5 HYPERLIPIDEMIA, UNSPECIFIED HYPERLIPIDEMIA TYPE: ICD-10-CM

## 2021-10-23 DIAGNOSIS — R73.03 PREDIABETES: ICD-10-CM

## 2021-10-23 DIAGNOSIS — E66.01 MORBID OBESITY WITH BMI OF 40.0-44.9, ADULT (HCC): ICD-10-CM

## 2021-10-23 PROCEDURE — 93306 TTE W/DOPPLER COMPLETE: CPT | Performed by: NURSE PRACTITIONER

## 2021-10-27 ENCOUNTER — OFFICE VISIT (OUTPATIENT)
Dept: INTERNAL MEDICINE CLINIC | Facility: CLINIC | Age: 46
End: 2021-10-27
Payer: COMMERCIAL

## 2021-10-27 VITALS
WEIGHT: 284 LBS | HEART RATE: 87 BPM | BODY MASS INDEX: 40.2 KG/M2 | SYSTOLIC BLOOD PRESSURE: 124 MMHG | HEIGHT: 70.5 IN | DIASTOLIC BLOOD PRESSURE: 80 MMHG | OXYGEN SATURATION: 97 % | RESPIRATION RATE: 16 BRPM

## 2021-10-27 DIAGNOSIS — E66.01 MORBID OBESITY WITH BMI OF 40.0-44.9, ADULT (HCC): ICD-10-CM

## 2021-10-27 DIAGNOSIS — R73.03 PREDIABETES: ICD-10-CM

## 2021-10-27 DIAGNOSIS — E55.9 VITAMIN D DEFICIENCY: ICD-10-CM

## 2021-10-27 DIAGNOSIS — E78.5 HYPERLIPIDEMIA, UNSPECIFIED HYPERLIPIDEMIA TYPE: ICD-10-CM

## 2021-10-27 DIAGNOSIS — Z51.81 THERAPEUTIC DRUG MONITORING: Primary | ICD-10-CM

## 2021-10-27 PROCEDURE — 3074F SYST BP LT 130 MM HG: CPT | Performed by: NURSE PRACTITIONER

## 2021-10-27 PROCEDURE — 3079F DIAST BP 80-89 MM HG: CPT | Performed by: NURSE PRACTITIONER

## 2021-10-27 PROCEDURE — 3008F BODY MASS INDEX DOCD: CPT | Performed by: NURSE PRACTITIONER

## 2021-10-27 PROCEDURE — 99214 OFFICE O/P EST MOD 30 MIN: CPT | Performed by: NURSE PRACTITIONER

## 2021-10-27 RX ORDER — PHENTERMINE HYDROCHLORIDE 15 MG/1
15 CAPSULE ORAL EVERY MORNING
Qty: 30 CAPSULE | Refills: 1 | Status: SHIPPED | OUTPATIENT
Start: 2021-10-27 | End: 2022-01-04

## 2021-10-27 RX ORDER — ERGOCALCIFEROL 1.25 MG/1
50000 CAPSULE ORAL WEEKLY
Qty: 6 CAPSULE | Refills: 0 | Status: SHIPPED | OUTPATIENT
Start: 2021-10-27 | End: 2022-01-04

## 2021-10-27 RX ORDER — METFORMIN HYDROCHLORIDE 500 MG/1
500 TABLET, EXTENDED RELEASE ORAL 2 TIMES DAILY WITH MEALS
Qty: 60 TABLET | Refills: 1 | Status: SHIPPED | OUTPATIENT
Start: 2021-10-27 | End: 2022-01-04

## 2021-10-27 NOTE — PROGRESS NOTES
HISTORY OF PRESENT ILLNESS  Patient presents with:  Weight Check: down [id number] Stu Hoff is a 48year old male here for follow up with medical weight loss program for the treatment of overweight, obesity, or morbid obesity.      Down 9 lbs (first month f breathing non labored  CARDIO: RRR without murmur  GI: +BS, NT/ND, no masses or HSM  EXTREMITIES: no cyanosis, no clubbing, no edema  PSYCH: pleasant, cooperative, normal mood and affect    Lab Results   Component Value Date    GLU 95 09/15/2018    BUN 12 · Reviewed labs- reviewed outside labs  · Continue with vitamin d OTC   · Stress echo was normal  · HLD  Stable, lifestyle education given, follows with PCP   · prediabetes, reviewed last a1c 6.0% on 9/2021  · Nutrition: Low carb diet, recommended to eat

## 2021-10-28 NOTE — PATIENT INSTRUCTIONS
We are here to support you with weight loss, but please remember that you still need your primary care provider for your routine health maintenance.       PLAN:  Will continue with phentermine 15mg  Follow up with me in 5 weeks  Schedule follow up appointme INPUT> Look at nutrition section-- \"nutrients\" and it will break down your macros for the day (ie. Protein, carbs, fibers, sugars and fats). Try to stay within these numbers daily     2.  \"7 minute workout\" to help with exercise/activity which takes 7 m

## 2021-11-09 RX ORDER — ALLOPURINOL 100 MG/1
100 TABLET ORAL DAILY
Qty: 90 TABLET | Refills: 0 | Status: SHIPPED | OUTPATIENT
Start: 2021-11-09 | End: 2022-02-04

## 2021-11-09 NOTE — TELEPHONE ENCOUNTER
Been Following AD  Last OV 8/5/21  Last CPE N/A  Last Labs ALT+AST, CMP 9/15/18    Last Rx fill Allopurinol 100mg #90 0R 8/5/21    Future Appointments   Date Time Provider Butch Hernandez   12/1/2021  2:40 PM LUCY Chand [facility]

## 2021-12-01 ENCOUNTER — OFFICE VISIT (OUTPATIENT)
Dept: INTERNAL MEDICINE CLINIC | Facility: CLINIC | Age: 46
End: 2021-12-01
Payer: COMMERCIAL

## 2021-12-01 VITALS
WEIGHT: 273 LBS | DIASTOLIC BLOOD PRESSURE: 80 MMHG | OXYGEN SATURATION: 97 % | HEART RATE: 96 BPM | BODY MASS INDEX: 38.65 KG/M2 | HEIGHT: 70.5 IN | SYSTOLIC BLOOD PRESSURE: 124 MMHG

## 2021-12-01 DIAGNOSIS — Z51.81 THERAPEUTIC DRUG MONITORING: Primary | ICD-10-CM

## 2021-12-01 DIAGNOSIS — E78.5 HYPERLIPIDEMIA, UNSPECIFIED HYPERLIPIDEMIA TYPE: ICD-10-CM

## 2021-12-01 DIAGNOSIS — R73.03 PREDIABETES: ICD-10-CM

## 2021-12-01 DIAGNOSIS — E66.9 OBESITY WITH BODY MASS INDEX (BMI) OF 35.0 TO 39.9 WITHOUT COMORBIDITY: ICD-10-CM

## 2021-12-01 DIAGNOSIS — E55.9 VITAMIN D DEFICIENCY: ICD-10-CM

## 2021-12-01 PROCEDURE — 3074F SYST BP LT 130 MM HG: CPT | Performed by: NURSE PRACTITIONER

## 2021-12-01 PROCEDURE — 3079F DIAST BP 80-89 MM HG: CPT | Performed by: NURSE PRACTITIONER

## 2021-12-01 PROCEDURE — 3008F BODY MASS INDEX DOCD: CPT | Performed by: NURSE PRACTITIONER

## 2021-12-01 PROCEDURE — 99214 OFFICE O/P EST MOD 30 MIN: CPT | Performed by: NURSE PRACTITIONER

## 2021-12-01 NOTE — PATIENT INSTRUCTIONS
We are here to support you with weight loss, but please remember that you still need your primary care provider for your routine health maintenance.       PLAN:  Will continue with phentermine 15mg and metformin 500mg bid  Follow up with me in 4-8 weeks  Sc ** Daily INPUT> Look at nutrition section-- \"nutrients\" and it will break down your macros for the day (ie. Protein, carbs, fibers, sugars and fats). Try to stay within these numbers daily     2.  \"7 minute workout\" to help with exercise/act

## 2021-12-01 NOTE — PROGRESS NOTES
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      Down 11 lbs  Compliant o without murmur  GI: +BS, NT/ND, no masses or HSM  EXTREMITIES: no cyanosis, no clubbing, no edema  PSYCH: pleasant, cooperative, normal mood and affect    Lab Results   Component Value Date    GLU 95 09/15/2018    BUN 12 09/15/2018    ALKPHO 72 09/15/2018 metformin 500mg bid  · --advised of side effects and adverse effects of this medication  ?  Contradictions: avoid topamax (hx kidney stone)  · Reviewed labs- reviewed outside labs  · Continue with vitamin d OTC   · HLD  Stable, lifestyle education given, fo

## 2022-01-04 ENCOUNTER — OFFICE VISIT (OUTPATIENT)
Dept: INTERNAL MEDICINE CLINIC | Facility: CLINIC | Age: 47
End: 2022-01-04
Payer: COMMERCIAL

## 2022-01-04 VITALS
RESPIRATION RATE: 16 BRPM | DIASTOLIC BLOOD PRESSURE: 82 MMHG | HEIGHT: 70.5 IN | OXYGEN SATURATION: 99 % | BODY MASS INDEX: 37.23 KG/M2 | HEART RATE: 87 BPM | WEIGHT: 263 LBS | SYSTOLIC BLOOD PRESSURE: 120 MMHG

## 2022-01-04 DIAGNOSIS — E66.9 OBESITY WITH BODY MASS INDEX (BMI) OF 35.0 TO 39.9 WITHOUT COMORBIDITY: ICD-10-CM

## 2022-01-04 DIAGNOSIS — R73.03 PREDIABETES: ICD-10-CM

## 2022-01-04 DIAGNOSIS — E55.9 VITAMIN D DEFICIENCY: ICD-10-CM

## 2022-01-04 DIAGNOSIS — E78.5 HYPERLIPIDEMIA, UNSPECIFIED HYPERLIPIDEMIA TYPE: ICD-10-CM

## 2022-01-04 DIAGNOSIS — Z51.81 THERAPEUTIC DRUG MONITORING: Primary | ICD-10-CM

## 2022-01-04 PROCEDURE — 3008F BODY MASS INDEX DOCD: CPT | Performed by: NURSE PRACTITIONER

## 2022-01-04 PROCEDURE — 3079F DIAST BP 80-89 MM HG: CPT | Performed by: NURSE PRACTITIONER

## 2022-01-04 PROCEDURE — 3074F SYST BP LT 130 MM HG: CPT | Performed by: NURSE PRACTITIONER

## 2022-01-04 PROCEDURE — 99213 OFFICE O/P EST LOW 20 MIN: CPT | Performed by: NURSE PRACTITIONER

## 2022-01-04 RX ORDER — PHENTERMINE HYDROCHLORIDE 15 MG/1
15 CAPSULE ORAL EVERY MORNING
Qty: 30 CAPSULE | Refills: 2 | Status: SHIPPED | OUTPATIENT
Start: 2022-01-04

## 2022-01-04 RX ORDER — METFORMIN HYDROCHLORIDE 500 MG/1
500 TABLET, EXTENDED RELEASE ORAL 2 TIMES DAILY WITH MEALS
Qty: 60 TABLET | Refills: 2 | Status: SHIPPED | OUTPATIENT
Start: 2022-01-04

## 2022-01-04 NOTE — PATIENT INSTRUCTIONS
We are here to support you with weight loss, but please remember that you still need your primary care provider for your routine health maintenance.       PLAN:  Will continue with phentermine 15mg and metformin 500mg bid  Follow up with me in 8-12 weeks  S ** Daily INPUT> Look at nutrition section-- \"nutrients\" and it will break down your macros for the day (ie. Protein, carbs, fibers, sugars and fats). Try to stay within these numbers daily     2.  \"7 minute workout\" to help with exercise/activi

## 2022-01-04 NOTE — PROGRESS NOTES
HISTORY OF PRESENT ILLNESS  Patient presents with:  Weight Check: down [address] [facility] Pablito Garcia is a 48year old male here for follow up with medical weight loss program for the treatment of overweight, obesity, or morbid obesity.      Down 10 lbs  Compliant o HSM  EXTREMITIES: no cyanosis, no clubbing, no edema    Lab Results   Component Value Date    GLU 95 09/15/2018    BUN 12 09/15/2018    ALKPHO 72 09/15/2018    AST 26 09/15/2018    ALT 49 09/15/2018    TP 70 09/15/2018    GLOBULIN 2.5 09/15/2018     lifestyle education given, follows with PCP   · prediabetes, reviewed last a1c 6.0% on 9/2021- on metformin   · Showed synopsis graph- congratulated on success  · Encouraged to add in strength training to exercise  · Nutrition: Low carb diet, recommended t

## 2022-02-07 RX ORDER — ALLOPURINOL 100 MG/1
100 TABLET ORAL DAILY
Qty: 90 TABLET | Refills: 0 | Status: SHIPPED | OUTPATIENT
Start: 2022-02-07

## 2022-02-07 NOTE — TELEPHONE ENCOUNTER
Last VISIT 08/05/21    Last CPE  Not on file    Last REFILL 11/09/21 qty 90 w/0 refills    Last LABS 11/04/20 Covid test done    No Future Appointments      Per PROTOCOL? Not on protocol      Please Approve or Deny.

## 2022-03-01 ENCOUNTER — OFFICE VISIT (OUTPATIENT)
Dept: INTERNAL MEDICINE CLINIC | Facility: CLINIC | Age: 47
End: 2022-03-01
Payer: COMMERCIAL

## 2022-03-01 VITALS
HEIGHT: 70.5 IN | WEIGHT: 253 LBS | OXYGEN SATURATION: 97 % | RESPIRATION RATE: 17 BRPM | HEART RATE: 88 BPM | BODY MASS INDEX: 35.82 KG/M2 | DIASTOLIC BLOOD PRESSURE: 80 MMHG | SYSTOLIC BLOOD PRESSURE: 122 MMHG

## 2022-03-01 DIAGNOSIS — E66.9 OBESITY WITH BODY MASS INDEX (BMI) OF 35.0 TO 39.9 WITHOUT COMORBIDITY: ICD-10-CM

## 2022-03-01 DIAGNOSIS — Z51.81 THERAPEUTIC DRUG MONITORING: Primary | ICD-10-CM

## 2022-03-01 DIAGNOSIS — R73.03 PREDIABETES: ICD-10-CM

## 2022-03-01 DIAGNOSIS — E55.9 VITAMIN D DEFICIENCY: ICD-10-CM

## 2022-03-01 DIAGNOSIS — E78.5 HYPERLIPIDEMIA, UNSPECIFIED HYPERLIPIDEMIA TYPE: ICD-10-CM

## 2022-03-01 PROCEDURE — 3008F BODY MASS INDEX DOCD: CPT | Performed by: NURSE PRACTITIONER

## 2022-03-01 PROCEDURE — 99213 OFFICE O/P EST LOW 20 MIN: CPT | Performed by: NURSE PRACTITIONER

## 2022-03-01 PROCEDURE — 3074F SYST BP LT 130 MM HG: CPT | Performed by: NURSE PRACTITIONER

## 2022-03-01 PROCEDURE — 3079F DIAST BP 80-89 MM HG: CPT | Performed by: NURSE PRACTITIONER

## 2022-03-01 RX ORDER — PHENTERMINE HYDROCHLORIDE 15 MG/1
15 CAPSULE ORAL EVERY MORNING
Qty: 30 CAPSULE | Refills: 2 | Status: CANCELLED | OUTPATIENT
Start: 2022-03-01

## 2022-03-01 RX ORDER — METFORMIN HYDROCHLORIDE 500 MG/1
500 TABLET, EXTENDED RELEASE ORAL 2 TIMES DAILY WITH MEALS
Qty: 60 TABLET | Refills: 2 | Status: SHIPPED | OUTPATIENT
Start: 2022-03-01

## 2022-03-01 NOTE — PATIENT INSTRUCTIONS
We are here to support you with weight loss, but please remember that you still need your primary care provider for your routine health maintenance. PLAN:  Will continue with phentermine and metformin   Follow up with me in 8 weeks  Schedule follow up appointments: Juan Kim (dietitian) or Crystal Rodriguez (presurgery dietitian)   Check for insurance coverage for dietitian and labwork prior to scheduling appointment. Please try to work on the following dietary changes:  1. Goals: Aim for 20-30 grams of protein/ meal  i. Aim for 150 grams of carbohydrates/day  ii. Eat 4-6 vegetables/day  iii. Avoid skipping meals- eat every 4-5 hours  iv. Aim for 3 meals/day  2. Drink lots of water and cut down on soda/juice consumption if soda/juice drinker  3. Focus on protein: (15-30 grams with each meal) ie. greek yogurt, cottage cheese, string cheese, hard boiled eggs  4. Healthy snacks: peanut butter and apples, hummus and carrots, berries, nuts (1/4 cup), tuna and crackers                 Protein Shakes: Premier protein or Core Power                Protein Bars: Rx Bars, Oatmega, Power Crunch                 Sargento balanced breaks (cheese and nuts)- without chocolate  5. Reduce carbohydrates which includes sweets as well as rice, pasta, potatoes, bread, corn and instead choose whole grain options or more protein or vegetables (4-6 servings of vegetables per day)  6. Get a good night of sleep  7. Try to decrease stress in life     Please download apps:  1. \"My Fitness Pal\" (other option is Lose it)) to help you to monitor daily dietary intake and you will be able to see if you are eating the right amount of calories, protein, carbs                With My Fitness Pal-->When you set-up the ulices or need to adjust settings:                Goals should include:                  Lose 1.5-2 lbs per week                Activity level: not very active (can't count exercise towards calorie number per day)                   ** Daily INPUT> Look at nutrition section-- \"nutrients\" and it will break down your macros for the day (ie. Protein, carbs, fibers, sugars and fats). Try to stay within these numbers daily     2. \"7 minute workout\" to help with exercise/activity which takes 7 minutes of your day and that you can do at home! 3. \"Calm\" or \"Headspace\" which helps with mindfulness, meditation, clarity, sleep, and saadia to your daily life. 4. [employer] blog for healthy recipe ideas  5. Nestio for low carb resources    HIGH PROTEIN SNACK IDEAS  -cottage cheese  -plain yogurt  -kefir  -hard-boiled eggs  -natural cheeses  -nuts (measure portion size)   -unsweetened nut butters  -dried edamame   -chelsea seeds soaked in water or almond milk  -soy nuts  -cured meats (monitor for sodium issues)   -hummus with vegetables  -bean dip with vegetables     FRUIT  Low carb fruit options   Raspberries: Half a cup (60 grams) contains 3 grams of carbs. Blackberries: Half a cup (70 grams) contains 4 grams of carbs. Strawberries: Half a cup (100 grams) contains 6 grams of carbs. Blueberries: Half a cup (50 grams) contains 6 grams of carbs. Plum: One medium-sized (80 grams) contains 6 grams of carbs.      VEGETABLES  Low carb vegetables

## 2022-04-06 RX ORDER — PHENTERMINE HYDROCHLORIDE 15 MG/1
CAPSULE ORAL
Qty: 30 CAPSULE | Refills: 2 | Status: SHIPPED | OUTPATIENT
Start: 2022-04-06

## 2022-04-06 NOTE — TELEPHONE ENCOUNTER
Requesting Phentermine 15 mg  LOV: 3/1/22  RTC: 8 weeks  Last Relevant Labs: na  Filled: 1/4/22 #30 with 2 refills last [place] 3/8/22 [place]30 [place]    Future Appointments   Date Time Provider Butch Hernandez   4/26/2022  3:40 PM Alexander Dupree, LUCY [facility]

## 2022-04-26 ENCOUNTER — OFFICE VISIT (OUTPATIENT)
Dept: INTERNAL MEDICINE CLINIC | Facility: CLINIC | Age: 47
End: 2022-04-26
Payer: COMMERCIAL

## 2022-04-26 VITALS
OXYGEN SATURATION: 98 % | DIASTOLIC BLOOD PRESSURE: 70 MMHG | HEART RATE: 84 BPM | SYSTOLIC BLOOD PRESSURE: 130 MMHG | WEIGHT: 249 LBS | RESPIRATION RATE: 16 BRPM | HEIGHT: 70.5 IN | BODY MASS INDEX: 35.25 KG/M2

## 2022-04-26 DIAGNOSIS — R73.03 PREDIABETES: ICD-10-CM

## 2022-04-26 DIAGNOSIS — E55.9 VITAMIN D DEFICIENCY: ICD-10-CM

## 2022-04-26 DIAGNOSIS — E66.9 OBESITY WITH BODY MASS INDEX (BMI) OF 35.0 TO 39.9 WITHOUT COMORBIDITY: ICD-10-CM

## 2022-04-26 DIAGNOSIS — E78.5 HYPERLIPIDEMIA, UNSPECIFIED HYPERLIPIDEMIA TYPE: ICD-10-CM

## 2022-04-26 DIAGNOSIS — Z51.81 THERAPEUTIC DRUG MONITORING: Primary | ICD-10-CM

## 2022-04-26 PROCEDURE — 3008F BODY MASS INDEX DOCD: CPT | Performed by: NURSE PRACTITIONER

## 2022-04-26 PROCEDURE — 3078F DIAST BP <80 MM HG: CPT | Performed by: NURSE PRACTITIONER

## 2022-04-26 PROCEDURE — 3075F SYST BP GE 130 - 139MM HG: CPT | Performed by: NURSE PRACTITIONER

## 2022-04-26 PROCEDURE — 99213 OFFICE O/P EST LOW 20 MIN: CPT | Performed by: NURSE PRACTITIONER

## 2022-04-26 RX ORDER — PHENTERMINE HYDROCHLORIDE 37.5 MG/1
37.5 TABLET ORAL
Qty: 30 TABLET | Refills: 1 | Status: SHIPPED | OUTPATIENT
Start: 2022-04-26

## 2022-04-26 NOTE — PATIENT INSTRUCTIONS
We are here to support you with weight loss, but please remember that you still need your primary care provider for your routine health maintenance. PLAN:  Will increase phentermine and continue with metformin  Check out interm. Fasting 16:8- dr. Ryan Sanders   Follow up with me in 8 weeks  Schedule follow up appointments: Stu Del Castillo (dietitian) or Tenisha Miranda (presurgery dietitian)   Check for insurance coverage for dietitian and labwork prior to scheduling appointment. Please try to work on the following dietary changes:  1. Goals: Aim for 20-30 grams of protein/ meal  i. Aim for 150 grams of carbohydrates/day  ii. Eat 4-6 vegetables/day  iii. Avoid skipping meals- eat every 4-5 hours  iv. Aim for 3 meals/day  2. Drink lots of water and cut down on soda/juice consumption if soda/juice drinker  3. Focus on protein: (15-30 grams with each meal) ie. greek yogurt, cottage cheese, string cheese, hard boiled eggs  4. Healthy snacks: peanut butter and apples, hummus and carrots, berries, nuts (1/4 cup), tuna and crackers                 Protein Shakes: Premier protein or Core Power                Protein Bars: Rx Bars, Oatmega, Power Crunch                 Sargento balanced breaks (cheese and nuts)- without chocolate  5. Reduce carbohydrates which includes sweets as well as rice, pasta, potatoes, bread, corn and instead choose whole grain options or more protein or vegetables (4-6 servings of vegetables per day)  6. Get a good night of sleep  7. Try to decrease stress in life     Please download apps:  1. \"My Fitness Pal\" (other option is Lose it)) to help you to monitor daily dietary intake and you will be able to see if you are eating the right amount of calories, protein, carbs                With My Fitness Pal-->When you set-up the ulices or need to adjust settings:                Goals should include:                  Lose 1.5-2 lbs per week                Activity level: not very active (can't count exercise towards calorie number per day)                   ** Daily INPUT> Look at nutrition section-- \"nutrients\" and it will break down your macros for the day (ie. Protein, carbs, fibers, sugars and fats). Try to stay within these numbers daily     2. \"7 minute workout\" to help with exercise/activity which takes 7 minutes of your day and that you can do at home! 3. \"Calm\" or \"Headspace\" which helps with mindfulness, meditation, clarity, sleep, and saadia to your daily life. 4. [employer] blog for healthy recipe ideas  5. [employer] for low carb resources    HIGH PROTEIN SNACK IDEAS  -cottage cheese  -plain yogurt  -kefir  -hard-boiled eggs  -natural cheeses  -nuts (measure portion size)   -unsweetened nut butters  -dried edamame   -chelsea seeds soaked in water or almond milk  -soy nuts  -cured meats (monitor for sodium issues)   -hummus with vegetables  -bean dip with vegetables     FRUIT  Low carb fruit options   Raspberries: Half a cup (60 grams) contains 3 grams of carbs. Blackberries: Half a cup (70 grams) contains 4 grams of carbs. Strawberries: Half a cup (100 grams) contains 6 grams of carbs. Blueberries: Half a cup (50 grams) contains 6 grams of carbs. Plum: One medium-sized (80 grams) contains 6 grams of carbs.      VEGETABLES  Low carb vegetables

## 2022-05-09 RX ORDER — ALLOPURINOL 100 MG/1
TABLET ORAL
Qty: 90 TABLET | Refills: 0 | OUTPATIENT
Start: 2022-05-09

## 2022-05-09 RX ORDER — ALLOPURINOL 100 MG/1
100 TABLET ORAL DAILY
Qty: 90 TABLET | Refills: 0 | Status: SHIPPED | OUTPATIENT
Start: 2022-05-09

## 2022-05-09 NOTE — TELEPHONE ENCOUNTER
Last VISIT 08/05/21    Last CPE   Not on file    Last REFILL 02/07/22 qty 90 w/0 refills    Last LABS 11/04/20 Covid test done    No Future Appointments      Per PROTOCOL? Not on protocol      Please Approve or Deny.

## 2022-05-13 RX ORDER — METFORMIN HYDROCHLORIDE 500 MG/1
500 TABLET, EXTENDED RELEASE ORAL 2 TIMES DAILY WITH MEALS
Qty: 180 TABLET | Refills: 0 | Status: SHIPPED | OUTPATIENT
Start: 2022-05-13

## 2022-06-22 ENCOUNTER — LAB ENCOUNTER (OUTPATIENT)
Dept: LAB | Age: 47
End: 2022-06-22
Attending: NURSE PRACTITIONER
Payer: COMMERCIAL

## 2022-06-22 ENCOUNTER — OFFICE VISIT (OUTPATIENT)
Dept: INTERNAL MEDICINE CLINIC | Facility: CLINIC | Age: 47
End: 2022-06-22
Payer: COMMERCIAL

## 2022-06-22 VITALS
DIASTOLIC BLOOD PRESSURE: 80 MMHG | OXYGEN SATURATION: 100 % | RESPIRATION RATE: 17 BRPM | SYSTOLIC BLOOD PRESSURE: 118 MMHG | BODY MASS INDEX: 33.69 KG/M2 | HEIGHT: 70.5 IN | WEIGHT: 238 LBS | HEART RATE: 84 BPM

## 2022-06-22 DIAGNOSIS — R73.03 PREDIABETES: ICD-10-CM

## 2022-06-22 DIAGNOSIS — E66.9 OBESITY (BMI 30-39.9): ICD-10-CM

## 2022-06-22 DIAGNOSIS — E78.5 HYPERLIPIDEMIA, UNSPECIFIED HYPERLIPIDEMIA TYPE: ICD-10-CM

## 2022-06-22 DIAGNOSIS — E55.9 VITAMIN D DEFICIENCY: ICD-10-CM

## 2022-06-22 DIAGNOSIS — Z51.81 THERAPEUTIC DRUG MONITORING: Primary | ICD-10-CM

## 2022-06-22 DIAGNOSIS — Z51.81 THERAPEUTIC DRUG MONITORING: ICD-10-CM

## 2022-06-22 DIAGNOSIS — E66.9 OBESITY WITH BODY MASS INDEX (BMI) OF 35.0 TO 39.9 WITHOUT COMORBIDITY: ICD-10-CM

## 2022-06-22 LAB
EST. AVERAGE GLUCOSE BLD GHB EST-MCNC: 114 MG/DL (ref 68–126)
HBA1C MFR BLD: 5.6 % (ref ?–5.7)

## 2022-06-22 PROCEDURE — 3074F SYST BP LT 130 MM HG: CPT | Performed by: NURSE PRACTITIONER

## 2022-06-22 PROCEDURE — 3008F BODY MASS INDEX DOCD: CPT | Performed by: NURSE PRACTITIONER

## 2022-06-22 PROCEDURE — 83036 HEMOGLOBIN GLYCOSYLATED A1C: CPT | Performed by: NURSE PRACTITIONER

## 2022-06-22 PROCEDURE — 3079F DIAST BP 80-89 MM HG: CPT | Performed by: NURSE PRACTITIONER

## 2022-06-22 PROCEDURE — 99214 OFFICE O/P EST MOD 30 MIN: CPT | Performed by: NURSE PRACTITIONER

## 2022-06-22 RX ORDER — PHENTERMINE HYDROCHLORIDE 37.5 MG/1
37.5 TABLET ORAL
Qty: 30 TABLET | Refills: 2 | Status: SHIPPED | OUTPATIENT
Start: 2022-06-22

## 2022-06-22 NOTE — PATIENT INSTRUCTIONS
We are here to support you with weight loss, but please remember that you still need your primary care provider for your routine health maintenance. PLAN:  Will continue with phentermine 37.5mg and metformin 500mg bid   Continue with strength training   Follow up with me in 8 weeks  Schedule follow up appointments: Dillan Cruz (dietitian) or Dyllan Weathers (presurgery dietitian)   Check for insurance coverage for dietitian and labwork prior to scheduling appointment. Please try to work on the following dietary changes:  1. Goals: Aim for 20-30 grams of protein/ meal  i. Aim for 120 grams of carbohydrates/day  ii. Eat 4-6 vegetables/day  iii. Avoid skipping meals- eat every 4-5 hours  iv. Aim for 3 meals/day  2. Drink lots of water and cut down on soda/juice consumption if soda/juice drinker  3. Focus on protein: (15-30 grams with each meal) ie. greek yogurt, cottage cheese, string cheese, hard boiled eggs  4. Healthy snacks: peanut butter and apples, hummus and carrots, berries, nuts (1/4 cup), tuna and crackers                 Protein Shakes: Premier protein or Core Power                Protein Bars: Rx Bars, Oatmega, Power Crunch                 Sargento balanced breaks (cheese and nuts)- without chocolate  5. Reduce carbohydrates which includes sweets as well as rice, pasta, potatoes, bread, corn and instead choose whole grain options or more protein or vegetables (4-6 servings of vegetables per day)  6. Get a good night of sleep  7. Try to decrease stress in life     Please download apps:  1. \"My Fitness Pal\" (other option is Lose it)) to help you to monitor daily dietary intake and you will be able to see if you are eating the right amount of calories, protein, carbs                With My Fitness Pal-->When you set-up the ulices or need to adjust settings:                Goals should include:                  Lose 1.5-2 lbs per week                Activity level: not very active (can't count exercise towards calorie number per day)                   ** Daily INPUT> Look at nutrition section-- \"nutrients\" and it will break down your macros for the day (ie. Protein, carbs, fibers, sugars and fats). Try to stay within these numbers daily     2. \"7 minute workout\" to help with exercise/activity which takes 7 minutes of your day and that you can do at home! 3. \"Calm\" or \"Headspace\" which helps with mindfulness, meditation, clarity, sleep, and saadia to your daily life. 4. [employer] blog for healthy recipe ideas  5. [employer] for low carb resources    HIGH PROTEIN SNACK IDEAS  -cottage cheese  -plain yogurt  -kefir  -hard-boiled eggs  -natural cheeses  -nuts (measure portion size)   -unsweetened nut butters  -dried edamame   -chelsea seeds soaked in water or almond milk  -soy nuts  -cured meats (monitor for sodium issues)   -hummus with vegetables  -bean dip with vegetables     FRUIT  Low carb fruit options   Raspberries: Half a cup (60 grams) contains 3 grams of carbs. Blackberries: Half a cup (70 grams) contains 4 grams of carbs. Strawberries: Half a cup (100 grams) contains 6 grams of carbs. Blueberries: Half a cup (50 grams) contains 6 grams of carbs. Plum: One medium-sized (80 grams) contains 6 grams of carbs.      VEGETABLES  Low carb vegetables

## 2022-07-15 ENCOUNTER — TELEPHONE (OUTPATIENT)
Dept: INTERNAL MEDICINE CLINIC | Facility: CLINIC | Age: 47
End: 2022-07-15

## 2022-07-15 ENCOUNTER — OFFICE VISIT (OUTPATIENT)
Dept: INTERNAL MEDICINE CLINIC | Facility: CLINIC | Age: 47
End: 2022-07-15
Payer: COMMERCIAL

## 2022-07-15 VITALS
TEMPERATURE: 98 F | SYSTOLIC BLOOD PRESSURE: 126 MMHG | BODY MASS INDEX: 33.41 KG/M2 | RESPIRATION RATE: 16 BRPM | DIASTOLIC BLOOD PRESSURE: 78 MMHG | HEART RATE: 86 BPM | WEIGHT: 236 LBS | OXYGEN SATURATION: 97 % | HEIGHT: 70.5 IN

## 2022-07-15 DIAGNOSIS — L03.116 CELLULITIS OF LEFT LOWER EXTREMITY: Primary | ICD-10-CM

## 2022-07-15 PROCEDURE — 3078F DIAST BP <80 MM HG: CPT | Performed by: INTERNAL MEDICINE

## 2022-07-15 PROCEDURE — 3008F BODY MASS INDEX DOCD: CPT | Performed by: INTERNAL MEDICINE

## 2022-07-15 PROCEDURE — 3074F SYST BP LT 130 MM HG: CPT | Performed by: INTERNAL MEDICINE

## 2022-07-15 PROCEDURE — 99213 OFFICE O/P EST LOW 20 MIN: CPT | Performed by: INTERNAL MEDICINE

## 2022-07-15 RX ORDER — CEPHALEXIN 500 MG/1
500 CAPSULE ORAL 4 TIMES DAILY
Qty: 28 CAPSULE | Refills: 0 | Status: SHIPPED | OUTPATIENT
Start: 2022-07-15 | End: 2022-07-15

## 2022-07-15 RX ORDER — CEPHALEXIN 500 MG/1
500 CAPSULE ORAL 4 TIMES DAILY
Qty: 28 CAPSULE | Refills: 0 | Status: SHIPPED | OUTPATIENT
Start: 2022-07-15 | End: 2022-07-22

## 2022-07-15 NOTE — TELEPHONE ENCOUNTER
Patient called and stated that the antibiotic from [facility] was sent to [facility] and the electricity is out please re-send to [facility] on file.

## 2022-08-03 RX ORDER — ALLOPURINOL 100 MG/1
TABLET ORAL
Qty: 90 TABLET | Refills: 0 | OUTPATIENT
Start: 2022-08-03

## 2022-08-03 RX ORDER — ALLOPURINOL 100 MG/1
100 TABLET ORAL DAILY
Qty: 90 TABLET | Refills: 0 | Status: SHIPPED | OUTPATIENT
Start: 2022-08-03

## 2022-08-03 NOTE — TELEPHONE ENCOUNTER
Last VISIT 07/15/22    Last CPE Not on file    Last REFILL 05/09/22 qty 90 w/0 refills    Last LABS 06/22/22 A1c done    No Future Appointments      Per PROTOCOL? Not on protocol      Please Approve or Deny.

## 2022-08-10 ENCOUNTER — OFFICE VISIT (OUTPATIENT)
Dept: INTERNAL MEDICINE CLINIC | Facility: CLINIC | Age: 47
End: 2022-08-10
Payer: COMMERCIAL

## 2022-08-10 VITALS
BODY MASS INDEX: 33.21 KG/M2 | RESPIRATION RATE: 16 BRPM | SYSTOLIC BLOOD PRESSURE: 124 MMHG | OXYGEN SATURATION: 98 % | HEIGHT: 70 IN | DIASTOLIC BLOOD PRESSURE: 84 MMHG | WEIGHT: 232 LBS | HEART RATE: 78 BPM

## 2022-08-10 DIAGNOSIS — E78.5 HYPERLIPIDEMIA, UNSPECIFIED HYPERLIPIDEMIA TYPE: ICD-10-CM

## 2022-08-10 DIAGNOSIS — Z51.81 THERAPEUTIC DRUG MONITORING: Primary | ICD-10-CM

## 2022-08-10 DIAGNOSIS — R73.03 PREDIABETES: ICD-10-CM

## 2022-08-10 DIAGNOSIS — E66.9 OBESITY (BMI 30-39.9): ICD-10-CM

## 2022-08-10 DIAGNOSIS — E55.9 VITAMIN D DEFICIENCY: ICD-10-CM

## 2022-08-10 PROCEDURE — 3008F BODY MASS INDEX DOCD: CPT | Performed by: NURSE PRACTITIONER

## 2022-08-10 PROCEDURE — 3079F DIAST BP 80-89 MM HG: CPT | Performed by: NURSE PRACTITIONER

## 2022-08-10 PROCEDURE — 3074F SYST BP LT 130 MM HG: CPT | Performed by: NURSE PRACTITIONER

## 2022-08-10 PROCEDURE — 99214 OFFICE O/P EST MOD 30 MIN: CPT | Performed by: NURSE PRACTITIONER

## 2022-08-10 RX ORDER — METFORMIN HYDROCHLORIDE 500 MG/1
500 TABLET, EXTENDED RELEASE ORAL 2 TIMES DAILY WITH MEALS
Qty: 180 TABLET | Refills: 0 | Status: SHIPPED | OUTPATIENT
Start: 2022-08-10

## 2022-08-10 NOTE — PATIENT INSTRUCTIONS
We are here to support you with weight loss, but please remember that you still need your primary care provider for your routine health maintenance. PLAN:  Will continue with phentermine and metformin 500mg  Keep up the great work! Follow up with me in 12 weeks  Schedule follow up appointments: Ricardo Ramirez (dietitian) or Waldo Perez (presurgery dietitian)   Check for insurance coverage for dietitian and labwork prior to scheduling appointment. Please try to work on the following dietary changes:  1. Goals: Aim for 20-30 grams of protein/ meal  i. Aim for 130 grams of carbohydrates/day  ii. Eat 4-6 vegetables/day  iii. Avoid skipping meals- eat every 4-5 hours  iv. Aim for 3 meals/day  2. Drink lots of water and cut down on soda/juice consumption if soda/juice drinker  3. Focus on protein: (15-30 grams with each meal) ie. greek yogurt, cottage cheese, string cheese, hard boiled eggs  4. Healthy snacks: peanut butter and apples, hummus and carrots, berries, nuts (1/4 cup), tuna and crackers                 Protein Shakes: Premier protein or Core Power                Protein Bars: Rx Bars, Oatmega, Power Crunch                 Sargento balanced breaks (cheese and nuts)- without chocolate  5. Reduce carbohydrates which includes sweets as well as rice, pasta, potatoes, bread, corn and instead choose whole grain options or more protein or vegetables (4-6 servings of vegetables per day)  6. Get a good night of sleep  7. Try to decrease stress in life     Please download apps:  1. \"My Fitness Pal\" (other option is Lose it)) to help you to monitor daily dietary intake and you will be able to see if you are eating the right amount of calories, protein, carbs                With My Fitness Pal-->When you set-up the ulices or need to adjust settings:                Goals should include:                  Lose 1.5-2 lbs per week                Activity level: not very active (can't count exercise towards calorie number per day)                   ** Daily INPUT> Look at nutrition section-- \"nutrients\" and it will break down your macros for the day (ie. Protein, carbs, fibers, sugars and fats). Try to stay within these numbers daily     2. \"7 minute workout\" to help with exercise/activity which takes 7 minutes of your day and that you can do at home! 3. \"Calm\" or \"Headspace\" which helps with mindfulness, meditation, clarity, sleep, and saadia to your daily life. 4. [employer] blog for healthy recipe ideas  5. [employer] for low carb resources    HIGH PROTEIN SNACK IDEAS  -cottage cheese  -plain yogurt  -kefir  -hard-boiled eggs  -natural cheeses  -nuts (measure portion size)   -unsweetened nut butters  -dried edamame   -chelsea seeds soaked in water or almond milk  -soy nuts  -cured meats (monitor for sodium issues)   -hummus with vegetables  -bean dip with vegetables     FRUIT  Low carb fruit options   Raspberries: Half a cup (60 grams) contains 3 grams of carbs. Blackberries: Half a cup (70 grams) contains 4 grams of carbs. Strawberries: Half a cup (100 grams) contains 6 grams of carbs. Blueberries: Half a cup (50 grams) contains 6 grams of carbs. Plum: One medium-sized (80 grams) contains 6 grams of carbs.      VEGETABLES  Low carb vegetables

## 2022-08-23 ENCOUNTER — TELEPHONE (OUTPATIENT)
Dept: INTERNAL MEDICINE CLINIC | Facility: CLINIC | Age: 47
End: 2022-08-23

## 2022-08-23 NOTE — TELEPHONE ENCOUNTER
LOV 7/15/22 with AD. Patient states s/s started Saturday night, tested COVID positive on Monday, sinus issues, raw ST, PND, head and nasal congestion, bright yellow mucus, full body aches, not as bad today, little cough, h/a, taking Ibuprofen and Mucinex as needed. Pt denies sob, cp, fever or bowel issues. Discussed isolation requirements, warm salt water gargles, otc cough med, hot tea and honey, increasing fluids. ER precautions given. Pt states these are mild s/s so he will monitor and call back if Paxlovid is something he would like to talk about within the 5 day window. Pt verbalizes understanding. FYI to AD.

## 2022-09-25 NOTE — TELEPHONE ENCOUNTER
Coming fax received from [facility] for refill on Levocetirizine. LOV 06/27/17. Labs 06/01/16. One refill sent. Pt is due for ASTHMA follow up. Please call Pt and assist with scheduling. Routed to [facility]. neurologic

## 2022-10-03 DIAGNOSIS — Z51.81 THERAPEUTIC DRUG MONITORING: ICD-10-CM

## 2022-10-03 RX ORDER — PHENTERMINE HYDROCHLORIDE 37.5 MG/1
37.5 TABLET ORAL
Qty: 30 TABLET | Refills: 2 | Status: SHIPPED | OUTPATIENT
Start: 2022-10-03

## 2022-11-09 ENCOUNTER — OFFICE VISIT (OUTPATIENT)
Dept: INTERNAL MEDICINE CLINIC | Facility: CLINIC | Age: 47
End: 2022-11-09
Payer: COMMERCIAL

## 2022-11-09 VITALS
WEIGHT: 217 LBS | HEIGHT: 70 IN | BODY MASS INDEX: 31.07 KG/M2 | HEART RATE: 80 BPM | DIASTOLIC BLOOD PRESSURE: 80 MMHG | SYSTOLIC BLOOD PRESSURE: 128 MMHG | RESPIRATION RATE: 16 BRPM

## 2022-11-09 DIAGNOSIS — Z51.81 THERAPEUTIC DRUG MONITORING: Primary | ICD-10-CM

## 2022-11-09 DIAGNOSIS — E78.5 HYPERLIPIDEMIA, UNSPECIFIED HYPERLIPIDEMIA TYPE: ICD-10-CM

## 2022-11-09 DIAGNOSIS — E66.9 OBESITY (BMI 30-39.9): ICD-10-CM

## 2022-11-09 DIAGNOSIS — R73.03 PREDIABETES: ICD-10-CM

## 2022-11-09 DIAGNOSIS — E55.9 VITAMIN D DEFICIENCY: ICD-10-CM

## 2022-11-09 PROCEDURE — 3079F DIAST BP 80-89 MM HG: CPT | Performed by: NURSE PRACTITIONER

## 2022-11-09 PROCEDURE — 3008F BODY MASS INDEX DOCD: CPT | Performed by: NURSE PRACTITIONER

## 2022-11-09 PROCEDURE — 3074F SYST BP LT 130 MM HG: CPT | Performed by: NURSE PRACTITIONER

## 2022-11-09 PROCEDURE — 99213 OFFICE O/P EST LOW 20 MIN: CPT | Performed by: NURSE PRACTITIONER

## 2022-11-09 NOTE — PATIENT INSTRUCTIONS
Next steps:  1. Fill your prescribed medication and take as discussed and prescribed:   2. If you are interested check out Dr. Pearl Hammonds or Dr. Jules Lucia (plastic surgery MDs)   3.  Schedule a personal nutrition consultation with one of our registered dieticians     Please try to work on the following dietary changes:    1. Drink water with meals and throughout the day, cut down on soda and/or juice if consumed. Consider flavored water options like Bubbly, Spindrift, Hint and Jordi. 2.  Eat breakfast daily and focus on having protein with each meal, examples include: greek yogurt, cottage cheese, hard boiled egg, whole grain toast with peanut butter. 3.  Reduce refined carbohydrates and sugars which includes items such as sweets, as well as rice, pasta, and bread and make sure to choose whole grain options when having them with just 1 serving per meal about the size of your inner palm. 4.  Consume non starchy veggies daily working towards making them a good 50% of your daily food intake. Add them to lunch and dinner consistently. 5.  Start a daily probiotic: VSL#3 is recommended, (order on line at www.vsl3. com). Take 1 capsule daily with water for 30 days, then reduce to 1 every other day (this will reduce the cost). Capsules can be left out for 2 weeks, but then must be refrigerated. Please download ulices My Fitness Tarun Stevens! Or Net Diary to monitor daily dietary intake and you will be able to see if you are eating the right amount of calories or too much or too little which would hinder weight loss. Additionally this will help to see your daily carbohydrate and protein intake. When you set the ulices up choose 1-2 lbs/week as a goal.  Keeping a paper food journal is an option as well to remain accountable for your choices- this is the start to mindful eating! A low calorie diet has been consistently shown to support weight loss. Continue or start exercising to help establish a routine.  If not already exercising begin with 1 day and progress as able with long-term goal of 30 minutes 5 days a week at a minimum. Meditation daily can help manage and control stress. Chronic stress can make weight loss difficult. Exercising is one way to help with stress, but meditation using the CALM Lucero or another comparable alternative can be done in your home or place of work with little time commitment. This Lucero can also help work on behavior change and improve sleep. Check out the segment under Calm Masterclass and listen to The 4 Pillars of Health. A great way to begin learning about the foundation of lifestyle with practical tips to use in your every day. Check out www.yourweightmatters. org blog for continued daily support and education along this weight loss journey! Patient Resources:     Personal Training/Fitness Classes/Health Coaching     Mihir Diallo and Lemon Sophiaside @ [email or web address]/ Full fitness center with group fitness and personal training. Discount available as client of [facility]. Health Coaching and Personal Training with Iglesia Staton at our [facility]- individual weekly coaching with option to add personal training and small group fitness classes targeted at weight loss- [phone] and/or email @ [email or web address]  360FIT Huyen [email or web address] Group Fitness [phone] and/or email Jer Ambrose at [email or web address]  [address] [facility] with multiple locations: [employer]et[employer] (www.Gordon Games. wooju), Eat The MadRat Games Fitness (www.Krimmeni Technologies. wooju), Fit Body Bootcamp (www.Tradabodybootcamp.wooju), Sonics Fitness (www.Trove. wooju), The Exercise  (www.exercisecoach.wooju)     Online Fitness  Fitness  on [employer] in 10 DVD series- www. bwpdw95FNG. wooju  Sit and Be Fit - Chair exercise series Www.sitandbefit. org  Hip Hop Fit with Hunter Simmons at www.hiphopfit. net     Apps for on the ABODO 7 Minute Workout (orange box with white 7) - free on the go HIIT training lucero  Peloton Lucero @ wwwKinopto     Nutrition Trackers and Tools  LoseIT! And My Fitness Pal apps and on line for tracking nutrition  NOOM - virtual health coaching  FitFoundation (healthy meals on the go) in [facility] @ www. gmbcehlyoeqvr8p. Jose Graves MD @ www.BVfon TelecommunicationtromdBitfury Group and America Wilson (keto and low carb plans recommended) @ www. CVSVTS60.FFI, Metabolic Meals @ www. MyMetabolicMeals. com - individual prepared meals to go  [employer]ventions, Nutrabolt, [employer], Every Plate, Home Dialysis Plus- on line meal delivery programs for preparation at home  AK Fluoresentric in [place] for homemade meals to go @ www.3Gear Systems. Nearway  Diet Doctor @ www. dietdoctor. com - low carb swaps  YuPROGENESIS TECHNOLOGIES - meal prep and planning lucero (www.yummly. com)     Stress Management/Behavior/Mindful Eating  CALM meditation lucero (www.Jenkins & Davies Mechanical Engineering)  Headspace  Am I Hungry? Mindful eating virtual  lucero  Www.yourweightmatters. org - Obesity Action Coalition sponsored Blog posts daily  Motivation lucero (black box with white \")- daily supportive messages sent to your phone     Books/Video Education/Podcasts  Mindless Eating by Sherie Gaytan  Why We Get Sick by Luisa Ca (a book about insulin resistance)  Atomic Habits by Blanca Hu (a book about taking small steps to promote greater behavior change)   Can't Hurt Me by Sara Sher (a book exploring the power of discipline in achieving your goals)  The End of Dieting: How to Live for Life by Dr. Dede Dixon M.D. or listen to The [address] Episode 61: Understanding \"Nutritarian\" Eating w/Dr. Dede Dixon  Your Body in Balance: The [employer] of Food, Hormones, and Health by Dr. Suzan Aldridge  The Menopause Diet Plan by [facility] AT [facility]  The Complete Guide to fasting by Dr. Chavez Malik, [address] [facility] by Mark Belcher, Ph.D, R.D.   Weight Loss Surgery Will Not Treat Food Addiction by Amadeo Miller Ph.D  The Game Changers- Cortexix Documentary on plant based nutrition  Fed Up - documentary about obesity (Free on New Protean Electrictown)  The Truth About Sugar - documentary on sugar (Free on Utube, https://youPageLeveru. be/3J7qrsoEL3b)  The Dr. Ekaterina Power by Dr. Ivonne Layne MD  Fitlosophy Fitspiration - journal to better health (found at [employer] in fitness aisle)  What Happened to You?- a look at the impact trauma has on behavior written by Eamon Ware and Dr. Chris Horvath Again by Carlton Ojeda - discovering your true self after trauma  Aparna Benavides talk on OleOle, The Call to Courage  Podcasts: The Exam Room by the Physician's Committee, Nutrition Facts by Dr. Sadler Favorite    We are here to support you with weight loss, but please remember that you still need your primary care provider for your routine health maintenance.

## 2022-11-13 DIAGNOSIS — Z51.81 THERAPEUTIC DRUG MONITORING: ICD-10-CM

## 2022-11-13 DIAGNOSIS — R73.03 PREDIABETES: ICD-10-CM

## 2022-11-14 RX ORDER — METFORMIN HYDROCHLORIDE 500 MG/1
500 TABLET, EXTENDED RELEASE ORAL 2 TIMES DAILY WITH MEALS
Qty: 180 TABLET | Refills: 0 | Status: SHIPPED | OUTPATIENT
Start: 2022-11-14

## 2022-11-14 RX ORDER — ALLOPURINOL 100 MG/1
100 TABLET ORAL DAILY
Qty: 90 TABLET | Refills: 0 | Status: SHIPPED | OUTPATIENT
Start: 2022-11-14

## 2023-01-03 ENCOUNTER — OFFICE VISIT (OUTPATIENT)
Dept: INTERNAL MEDICINE CLINIC | Facility: CLINIC | Age: 48
End: 2023-01-03
Payer: COMMERCIAL

## 2023-01-03 VITALS
HEART RATE: 80 BPM | RESPIRATION RATE: 16 BRPM | SYSTOLIC BLOOD PRESSURE: 134 MMHG | BODY MASS INDEX: 31.5 KG/M2 | DIASTOLIC BLOOD PRESSURE: 80 MMHG | WEIGHT: 220 LBS | HEIGHT: 70 IN | OXYGEN SATURATION: 97 %

## 2023-01-03 DIAGNOSIS — E66.9 OBESITY (BMI 30-39.9): ICD-10-CM

## 2023-01-03 DIAGNOSIS — Z51.81 THERAPEUTIC DRUG MONITORING: Primary | ICD-10-CM

## 2023-01-03 DIAGNOSIS — E55.9 VITAMIN D DEFICIENCY: ICD-10-CM

## 2023-01-03 DIAGNOSIS — R73.03 PREDIABETES: ICD-10-CM

## 2023-01-03 DIAGNOSIS — E78.5 HYPERLIPIDEMIA, UNSPECIFIED HYPERLIPIDEMIA TYPE: ICD-10-CM

## 2023-01-03 PROCEDURE — 3008F BODY MASS INDEX DOCD: CPT | Performed by: NURSE PRACTITIONER

## 2023-01-03 PROCEDURE — 3075F SYST BP GE 130 - 139MM HG: CPT | Performed by: NURSE PRACTITIONER

## 2023-01-03 PROCEDURE — 3079F DIAST BP 80-89 MM HG: CPT | Performed by: NURSE PRACTITIONER

## 2023-01-03 PROCEDURE — 99214 OFFICE O/P EST MOD 30 MIN: CPT | Performed by: NURSE PRACTITIONER

## 2023-01-03 RX ORDER — IBUPROFEN 600 MG/1
600 TABLET ORAL EVERY 6 HOURS PRN
COMMUNITY
Start: 2022-12-27

## 2023-01-03 RX ORDER — PHENTERMINE HYDROCHLORIDE 37.5 MG/1
37.5 TABLET ORAL
Qty: 30 TABLET | Refills: 2 | Status: SHIPPED | OUTPATIENT
Start: 2023-01-03

## 2023-01-03 NOTE — PATIENT INSTRUCTIONS
Next steps:  1. Fill your prescribed medication and take as discussed and prescribed: phentermine and metformin  2. Schedule a personal nutrition consultation with one of our registered dieticians     Please try to work on the following dietary changes:  Daily protein recommendation to start: 100-130  grams  Daily carbohydrate: <145g  Daily calories: 1,700  1. Drink water with meals and throughout the day, cut down on soda and/or juice if consumed. Consider flavored water options like Bubbly, Spindrift, Hint and Jordi. 2.  Eat breakfast daily and focus on having protein with each meal, examples include: greek yogurt, cottage cheese, hard boiled egg, whole grain toast with peanut butter. 3.  Reduce refined carbohydrates and sugars which includes items such as sweets, as well as rice, pasta, and bread and make sure to choose whole grain options when having them with just 1 serving per meal about the size of your inner palm. 4.  Consume non starchy veggies daily working towards making them a good 50% of your daily food intake. Add them to lunch and dinner consistently. 5.  Start a daily probiotic: VSL#3 is recommended, (order on line at www.vsl3. com). Take 1 capsule daily with water for 30 days, then reduce to 1 every other day (this will reduce the cost). Capsules can be left out for 2 weeks, but then must be refrigerated. Please download ulices Balluun Fitness Christ Kayser! Or Net Diary to monitor daily dietary intake and you will be able to see if you are eating the right amount of calories or too much or too little which would hinder weight loss. Additionally this will help to see your daily carbohydrate and protein intake. When you set the ulices up choose 1-2 lbs/week as a goal.  Keeping a paper food journal is an option as well to remain accountable for your choices- this is the start to mindful eating! A low calorie diet has been consistently shown to support weight loss.      Continue or start exercising to help establish a routine. If not already exercising begin with 1 day and progress as able with long-term goal of 30 minutes 5 days a week at a minimum. Meditation daily can help manage and control stress. Chronic stress can make weight loss difficult. Exercising is one way to help with stress, but meditation using the CALM Lucero or another comparable alternative can be done in your home or place of work with little time commitment. This Lucero can also help work on behavior change and improve sleep. Check out the segment under Calm Masterclass and listen to The 4 Pillars of Health. A great way to begin learning about the foundation of lifestyle with practical tips to use in your every day. Check out www.yourweightmatters. org blog for continued daily support and education along this weight loss journey! Patient Resources:     Personal Training/Fitness Classes/Health Coaching     Mihir Diallo and Ion Janettrivera @ [email or web address]/ Full fitness center with group fitness and personal training. Discount available as client of [facility]. Health Coaching and Personal Training with Sylvester Lee at our [facility]- individual weekly coaching with option to add personal training and small group fitness classes targeted at weight loss- [phone] and/or email @ [email or web address]  [facility] [email or web address] Group Fitness [phone] and/or email Julienne Caldera at [email or web address]  [address] [facility] with multiple locations: Aetna (www.Phigital), Eat The Frog Fitness (www.NewCare Solutions. Currently), Fit Body Bootcamp (www.alikebodybootcamp.Currently), Health Innovation Technologies Fitness (www.MitraSpan. Currently), The Exercise  (www.exercisecoach.Currently)     Online Fitness  Fitness  on Whole Foods in 10 DVD series- www. jukcc90CBV. Currently  Sit and Be Fit - Chair exercise series Www.sitandbefit. org  Hip Hop Fit with Hunter Simmons at www.hiphopfit. net     Apps for on the Knok 7 Minute Workout (orange box with white 7) - free on the go HIIT training lucero  Peloton Lucero @ www. alaTest     Nutrition Trackers and Tools  LoseIT! And My Fitness Pal apps and on line for tracking nutrition  NOOM - virtual health coaching  FitFoundation (healthy meals on the go) in [facility]-SCI @ www. aoqvtdtttiiod9w. Vesta Matos MD @ www.CosmosIDtromdiosil Energy and Jose Araujo (keto and low carb plans recommended) @ www. RYLGBH90.RSU, Metabolic Meals @ www. MyMetabolicMeals. com - individual prepared meals to go  Destiny Pharma, eucl3D, International Business Machines, Every Plate, Legacy Income Properties- on line meal delivery programs for preparation at home  [place] [facility] in [place] for homemade meals to go @ wwwSudhir Srivastava Robotic Surgery CentremealBeVocal. Nobao Renewable Energy Holdings  Diet Doctor @ www. dietdoctor. Nobao Renewable Energy Holdings - low carb swaps  YuMedClimate - meal prep and planning lucero (www.yummly. com)     Stress Management/Behavior/Mindful Eating  CALM meditation lucero (www.[facility])  Headspace  Am I Hungry? Mindful eating virtual  lucero  Www.yourweightmatters. org - Obesity Action Coalition sponsored Blog posts daily  Motivation lucero (black box with white \")- daily supportive messages sent to your phone     Books/Video Education/Podcasts  Mindless Eating by Lewis Montgomery  Why We Get Sick by Jose C Cavanaugh (a book about insulin resistance)  Atomic Habits by Myrna Arias (a book about taking small steps to promote greater behavior change)   Can't Hurt Me by Chloe Calvert (a book exploring the power of discipline in achieving your goals)  The End of Dieting: How to Live for Life by Dr. Chata Lassiter M.D. or listen to The [address] Episode 61: Understanding \"Nutritarian\" Eating w/Dr. Chata Lassiter  Your Body in Balance: The World Fuel Services Corporation of Food, Hormones, and Health by Dr. Caesar Matthew  The Menopause Diet Plan by Jeannine Calderon and [facility] AT [facility]  The Complete Guide to fasting by Dr. Caldwell July, [address] [facility] by Kiki Owen, Ph.D, R.D.   Weight Loss Surgery Will Not Treat Food Addiction by Indigo Phillips Ph.D  The [address] [place] [address] on plant based nutrition  Fed Up - documentary about obesity (Free on [facility])  The Truth About Sugar - documentary on sugar (Free on Utube, https://youElationEMRu. be/9D6avmeSH5c)  The Dr. Luis Shoemaker by Dr. Timothy Marin MD  Fitlosophy Fitspiration - journal to better health (found at [employer] in fitness aisle)  What Happened to You?- a look at the impact trauma has on behavior written by Alissa Lombardo and Dr. Wilmer Cartwright Again by Pramana - discovering your true self after trauma  Avelino Snyder talk on Marqui, The Call to Courage  Podcasts: The Exam Room by the Physician's Committee, Nutrition Facts by Dr. Cait Chaudhry    We are here to support you with weight loss, but please remember that you still need your primary care provider for your routine health maintenance.

## 2023-01-10 ENCOUNTER — PATIENT MESSAGE (OUTPATIENT)
Dept: INTERNAL MEDICINE CLINIC | Facility: CLINIC | Age: 48
End: 2023-01-10

## 2023-01-10 RX ORDER — DIETHYLPROPION HYDROCHLORIDE 75 MG/1
1 TABLET ORAL EVERY MORNING
Qty: 30 TABLET | Refills: 2 | Status: SHIPPED | OUTPATIENT
Start: 2023-01-10 | End: 2023-02-09

## 2023-02-15 DIAGNOSIS — Z51.81 THERAPEUTIC DRUG MONITORING: ICD-10-CM

## 2023-02-15 DIAGNOSIS — R73.03 PREDIABETES: ICD-10-CM

## 2023-02-17 RX ORDER — ALLOPURINOL 100 MG/1
100 TABLET ORAL DAILY
Qty: 90 TABLET | Refills: 0 | Status: SHIPPED | OUTPATIENT
Start: 2023-02-17

## 2023-02-17 RX ORDER — METFORMIN HYDROCHLORIDE 500 MG/1
500 TABLET, EXTENDED RELEASE ORAL 2 TIMES DAILY WITH MEALS
Qty: 180 TABLET | Refills: 0 | Status: SHIPPED | OUTPATIENT
Start: 2023-02-17

## 2023-02-17 NOTE — TELEPHONE ENCOUNTER
Last visit- 07/15/2022 cellulitis of left lower extremity     Last refill- 11/14/2022 allopurinol 100mg QTY90 0R    Last labs- no recent labs   Future Appointments   Date Time Provider Butch Hernandez   4/4/2023  4:00 PM LUCY [facility]       Per Protocol?   Please approve or deny

## 2023-04-04 ENCOUNTER — OFFICE VISIT (OUTPATIENT)
Dept: INTERNAL MEDICINE CLINIC | Facility: CLINIC | Age: 48
End: 2023-04-04
Payer: COMMERCIAL

## 2023-04-04 VITALS
DIASTOLIC BLOOD PRESSURE: 60 MMHG | SYSTOLIC BLOOD PRESSURE: 110 MMHG | OXYGEN SATURATION: 94 % | HEIGHT: 70 IN | RESPIRATION RATE: 18 BRPM | HEART RATE: 113 BPM | BODY MASS INDEX: 30.78 KG/M2 | WEIGHT: 215 LBS

## 2023-04-04 DIAGNOSIS — E78.5 HYPERLIPIDEMIA, UNSPECIFIED HYPERLIPIDEMIA TYPE: ICD-10-CM

## 2023-04-04 DIAGNOSIS — R73.03 PREDIABETES: ICD-10-CM

## 2023-04-04 DIAGNOSIS — Z51.81 THERAPEUTIC DRUG MONITORING: Primary | ICD-10-CM

## 2023-04-04 DIAGNOSIS — E66.9 OBESITY (BMI 30-39.9): ICD-10-CM

## 2023-04-04 DIAGNOSIS — E55.9 VITAMIN D DEFICIENCY: ICD-10-CM

## 2023-04-04 PROCEDURE — 99214 OFFICE O/P EST MOD 30 MIN: CPT | Performed by: NURSE PRACTITIONER

## 2023-04-04 PROCEDURE — 3008F BODY MASS INDEX DOCD: CPT | Performed by: NURSE PRACTITIONER

## 2023-04-04 PROCEDURE — 3074F SYST BP LT 130 MM HG: CPT | Performed by: NURSE PRACTITIONER

## 2023-04-04 PROCEDURE — 3078F DIAST BP <80 MM HG: CPT | Performed by: NURSE PRACTITIONER

## 2023-04-04 RX ORDER — DIETHYLPROPION HYDROCHLORIDE 75 MG/1
TABLET ORAL
COMMUNITY
Start: 2023-03-18

## 2023-04-05 RX ORDER — SEMAGLUTIDE 0.5 MG/.5ML
0.5 INJECTION, SOLUTION SUBCUTANEOUS WEEKLY
Qty: 2 ML | Refills: 0 | Status: SHIPPED | OUTPATIENT
Start: 2023-04-05 | End: 2023-04-27

## 2023-04-05 NOTE — PATIENT INSTRUCTIONS
Next steps:  1. Fill your prescribed medication and take as discussed and prescribed:   Will finish the rest of diethylpropion (maybe another week) and stop   Will continue with metformin for another month (2 tablets per day)---> then start weaning--> go to 1 tablet per day x1 month and then stop   Will trial wegovy 0.25mg weekly X 4 weeks (sample, demo given and 0.5mg sent to pharm)   2. Schedule a personal nutrition consultation with one of our registered dieticians       1. Drink water with meals and throughout the day, cut down on soda and/or juice if consumed. Consider flavored water options like Bubbly, Spindrift, Hint and Jordi. 2.  Eat breakfast daily and focus on having protein with each meal, examples include: greek yogurt, cottage cheese, hard boiled egg, whole grain toast with peanut butter. 3.  Reduce refined carbohydrates and sugars which includes items such as sweets, as well as rice, pasta, and bread and make sure to choose whole grain options when having them with just 1 serving per meal about the size of your inner palm. 4.  Consume non starchy veggies daily working towards making them a good 50% of your daily food intake. Add them to lunch and dinner consistently. 5.  Start a daily probiotic: VSL#3 is recommended, (order on line at www.vsl3. com). Take 1 capsule daily with water for 30 days, then reduce to 1 every other day (this will reduce the cost). Capsules can be left out for 2 weeks, but then must be refrigerated. Please download ulices My Fitness Keisha Reading! Or Net Diary to monitor daily dietary intake and you will be able to see if you are eating the right amount of calories or too much or too little which would hinder weight loss. Additionally this will help to see your daily carbohydrate and protein intake.  When you set the ulices up choose 1-2 lbs/week as a goal.  Keeping a paper food journal is an option as well to remain accountable for your choices- this is the start to mindful eating! A low calorie diet has been consistently shown to support weight loss. Continue or start exercising to help establish a routine. If not already exercising begin with 1 day and progress as able with long-term goal of 30 minutes 5 days a week at a minimum. Meditation daily can help manage and control stress. Chronic stress can make weight loss difficult. Exercising is one way to help with stress, but meditation using the CALM Lucero or another comparable alternative can be done in your home or place of work with little time commitment. This Lucero can also help work on behavior change and improve sleep. Check out the segment under Calm Masterclass and listen to The 4 Pillars of Health. A great way to begin learning about the foundation of lifestyle with practical tips to use in your every day. Check out www.yourweightmatters. org blog for continued daily support and education along this weight loss journey! Patient Resources:     Personal Training/Fitness Classes/Health Coaching     [facility] Center[facility]ETT and [place] @ [email or web address]/ Full fitness center with group fitness and personal training. Discount available as client of [facility]. Health Coaching and Personal Training with Julian Soria at our [facility]- individual weekly coaching with option to add personal training and small group fitness classes targeted at weight loss- [phone] and/or email @ [email or web address]  360FIT [place] [email or web address] Group Fitness [phone] and/or email Mihai Cao at [email or web address]  [address]  with multiple locations: Aetna (www.U.S. TrailMaps), Eat The Frog Fitness (www.QE Ventures. Quantock Brewery), Fit Body Bootcamp (www.Building Successful TeensbodyTavernp.Quantock Brewery), Coiney (www.Confluence Technologies), The Exercise  (www.exercisecoach.Quantock Brewery)     Online Fitness  Fitness  on Whole Foods in 10 DVD series- www. kbrji17PPQ. Buzztala  Sit and Be Fit - Chair exercise series Www.sitandbefit. org  Hip Hop Fit with Hunter Simmons at www.hiphopfit. net     Apps for on the Parrable 7 Minute Workout (orange box with white 7) - free on the go HIIT training lucero  Peloton Lucero @ wwwThe Green Office     Nutrition Trackers and Tools  LoseIT! And My Fitness Pal apps and on line for tracking nutrition  NOOM - virtual health coaching  FitFoundation (healthy meals on the go) in Sanmina-SCI @ wwwInnalabs Holding axwjebbknncgh4u. Jose Graves MD @ www.PlistentromdOneTwoTrip and America Wilson (keto and low carb plans recommended) @ www. RHACVZ60.URE, Metabolic Meals @ www. PlaceWise MediaMetabolicMeals. com - individual prepared meals to go  Applimation, [place] [employer], International Business Machines, Every Plate, Data Camp- on line meal delivery programs for preparation at home  [place] [employer] in [place] for homemade meals to go @ wwwFluGen. Buzztala  Diet Doctor @ www. dietdoctor. Buzztala - low carb swaps  Complete Solar - meal prep and planning lucero (www.yummly. com)     Stress Management/Behavior/Mindful Eating  CALM meditation lucero (www.calm. Buzztala)  Headspace  Am I Hungry? Mindful eating virtual  lucero  Www.yourweightmatters. org - Obesity Action Coalition sponsored Blog posts daily  Motivation lucero (black box with white \")- daily supportive messages sent to your phone     Books/Video Education/Podcasts  Mindless Eating by Sherie Gaytan  Why We Get Sick by Luisa Ca (a book about insulin resistance)  Atomic Habits by Blanca Hu (a book about taking small steps to promote greater behavior change)   Can't Hurt Me by Sara Sher (a book exploring the power of discipline in achieving your goals)  The End of Dieting: How to Live for Life by Dr. Dede Dixon M.D. or listen to The [address] Episode 61: Understanding \"Nutritarian\" Eating w/Dr. Dede Dixon  Your Body in Balance:  The World Fuel Services Corporation of Food, Hormones, and Health by Dr. Suzan Aldridge  The Menopause Diet Plan by [facility] Daniel Guillaume and [facility] AT [facility]  The Complete Guide to fasting by Dr. Deandre Villalpando, [address] [facility] by Hu Rodrigues, Ph.D, R.D. Weight Loss Surgery Will Not Treat Food Addiction by Henri Pagn Ph.D  The [address] [place] [address] on plant based nutrition  Fed Up - documentary about obesity (Free on [facility])  The Truth About Sugar - documentary on sugar (Free on Arteriocyte Medical Systems, https://youMagiqu. be/8M8cknbRK6i)  The Dr. Leelee Mcneil by Dr. Shelly Chang MD  Fitlosophy Fitspiration - journal to better health (found at [employer] in fitness aisle)  What Happened to You?- a look at the impact trauma has on behavior written by Virgen Magdaleno and Dr. Monica Bell Again by Shanti Rawls - discovering your true self after trauma  Crystal Sierra talk on E96, The Call to Courage  Podcasts: The Exam Room by the Physician's Committee, Nutrition Facts by Dr. Lidia Neff    We are here to support you with weight loss, but please remember that you still need your primary care provider for your routine health maintenance.

## 2023-04-06 ENCOUNTER — TELEPHONE (OUTPATIENT)
Dept: INTERNAL MEDICINE CLINIC | Facility: CLINIC | Age: 48
End: 2023-04-06

## 2023-04-06 NOTE — TELEPHONE ENCOUNTER
Cannot complete PA in [facility]  Called [facility] for information  Patient is Prime   ID [id number]    Form completed for Prime    Need to fax with last note to

## 2023-06-06 ENCOUNTER — OFFICE VISIT (OUTPATIENT)
Dept: INTERNAL MEDICINE CLINIC | Facility: CLINIC | Age: 48
End: 2023-06-06
Payer: COMMERCIAL

## 2023-06-06 VITALS
DIASTOLIC BLOOD PRESSURE: 78 MMHG | WEIGHT: 207 LBS | SYSTOLIC BLOOD PRESSURE: 118 MMHG | RESPIRATION RATE: 16 BRPM | HEIGHT: 70 IN | OXYGEN SATURATION: 98 % | HEART RATE: 84 BPM | BODY MASS INDEX: 29.63 KG/M2

## 2023-06-06 DIAGNOSIS — E66.9 OBESITY (BMI 30-39.9): ICD-10-CM

## 2023-06-06 DIAGNOSIS — Z51.81 THERAPEUTIC DRUG MONITORING: Primary | ICD-10-CM

## 2023-06-06 DIAGNOSIS — E55.9 VITAMIN D DEFICIENCY: ICD-10-CM

## 2023-06-06 DIAGNOSIS — E78.5 HYPERLIPIDEMIA, UNSPECIFIED HYPERLIPIDEMIA TYPE: ICD-10-CM

## 2023-06-06 DIAGNOSIS — R73.03 PREDIABETES: ICD-10-CM

## 2023-06-06 PROCEDURE — 99213 OFFICE O/P EST LOW 20 MIN: CPT | Performed by: NURSE PRACTITIONER

## 2023-06-06 PROCEDURE — 3074F SYST BP LT 130 MM HG: CPT | Performed by: NURSE PRACTITIONER

## 2023-06-06 PROCEDURE — 3008F BODY MASS INDEX DOCD: CPT | Performed by: NURSE PRACTITIONER

## 2023-06-06 PROCEDURE — 3078F DIAST BP <80 MM HG: CPT | Performed by: NURSE PRACTITIONER

## 2023-06-06 RX ORDER — PHENTERMINE HYDROCHLORIDE 37.5 MG/1
37.5 TABLET ORAL
Qty: 30 TABLET | Refills: 2 | Status: SHIPPED | OUTPATIENT
Start: 2023-06-09

## 2023-06-06 RX ORDER — METFORMIN HYDROCHLORIDE 500 MG/1
500 TABLET, EXTENDED RELEASE ORAL 2 TIMES DAILY WITH MEALS
Qty: 180 TABLET | Refills: 0 | Status: CANCELLED | OUTPATIENT
Start: 2023-06-06

## 2023-06-06 RX ORDER — PHENTERMINE HYDROCHLORIDE 37.5 MG/1
37.5 TABLET ORAL
COMMUNITY
Start: 2023-05-12 | End: 2023-06-06

## 2023-06-06 NOTE — PATIENT INSTRUCTIONS
Next steps:  1. Fill your prescribed medication and take as discussed and prescribed: phentermine   2. Schedule a personal nutrition consultation with one of our registered dieticians     Please try to work on the following dietary changes:  Daily protein recommendation to start:  grams  Daily carbohydrate: <135g  Daily calories: 1,600-1,700  1. Drink water with meals and throughout the day, cut down on soda and/or juice if consumed. Consider flavored water options like Bubbly, Spindrift, Hint and Jordi. 2.  Eat breakfast daily and focus on having protein with each meal, examples include: greek yogurt, cottage cheese, hard boiled egg, whole grain toast with peanut butter. 3.  Reduce refined carbohydrates and sugars which includes items such as sweets, as well as rice, pasta, and bread and make sure to choose whole grain options when having them with just 1 serving per meal about the size of your inner palm. 4.  Consume non starchy veggies daily working towards making them a good 50% of your daily food intake. Add them to lunch and dinner consistently. 5.  Start a daily probiotic: VSL#3 is recommended, (order on line at www.vsl3. com). Take 1 capsule daily with water for 30 days, then reduce to 1 every other day (this will reduce the cost). Capsules can be left out for 2 weeks, but then must be refrigerated. Please download ulices My Fitness Duke Romo! Or Net Diary to monitor daily dietary intake and you will be able to see if you are eating the right amount of calories or too much or too little which would hinder weight loss. Additionally this will help to see your daily carbohydrate and protein intake. When you set the ulices up choose 1-2 lbs/week as a goal.  Keeping a paper food journal is an option as well to remain accountable for your choices- this is the start to mindful eating! A low calorie diet has been consistently shown to support weight loss.      Continue or start exercising to help establish a routine. If not already exercising begin with 1 day and progress as able with long-term goal of 30 minutes 5 days a week at a minimum. Meditation daily can help manage and control stress. Chronic stress can make weight loss difficult. Exercising is one way to help with stress, but meditation using the CALM Lucero or another comparable alternative can be done in your home or place of work with little time commitment. This Lucero can also help work on behavior change and improve sleep. Check out the segment under Calm Masterclass and listen to The 4 Pillars of Health. A great way to begin learning about the foundation of lifestyle with practical tips to use in your every day. Check out www.yourweightmatters. org blog for continued daily support and education along this weight loss journey! Patient Resources:     Personal Training/Fitness Classes/Health Coaching     Mihri Diallo and [place] @ [email or web address]/ Full fitness center with group fitness and personal training. Discount available as client of [facility] Management. Health Coaching and Personal Training with Bismark Pinto at our [facility]- individual weekly coaching with option to add personal training and small group fitness classes targeted at weight loss- [phone] and/or email @ Otilio Robertson. [email or web address]  360FIT [place] [email or web address] Group Fitness [phone] and/or email Daniel Sainz at [email or web address]  [address] [facility] with multiple locations: Aetna (www.BioScrip. Climateminder), Eat The Frog Fitness (www.CityVoter. Climateminder), Fit Body Bootcamp (www.Insyde Softwarebodybootcamp.com), Billibox Fitness (www.IntegenX. Climateminder), The Exercise  (www.exercisecoach.Climateminder)     Online Fitness  Fitness  on Whole Foods in 10 DVD series- www. xkpgo46ELL. Climateminder  Sit and Be Fit - Chair exercise series Www.sitandbefit. org  Hip Hop Fit with Hunter Simmons at Design2Launch     Apps for on the MobiVita 7 Minute Workout (orange box with white 7) - free on the go HIIT training lucero  Peloton Lucero @ www. Quibly     Nutrition Trackers and Tools  LoseIT! And My Fitness Pal apps and on line for tracking nutrition  NOOM - virtual health coaching  FitFoundation (healthy meals on the go) in [facility] Cent[facility]-SC[facility] @ www. lyoinzzbnpffe6e. Allan Marshall MD @ www.scoo mobilityd.com and Myrtle Lerma (keto and low carb plans recommended) @ www. CEVPWK99.DUM, Metabolic Meals @ www. StunnMetabolicMeals. com - individual prepared meals to go  [place], GameGround, [facility], [facility], Identification International- on line meal delivery programs for preparation at home  AK DietBetter in [place] for homemade meals to go @ www.mealiSkoot. [place]owsai  Diet Doctor @ www. dietdoctor. Wowsai - low carb swaps  Yummly - meal prep and planning lucero (www.yummly. com)     Stress Management/Behavior/Mindful Eating  CALM meditation lucero (www.SnapAppointments)  Headspace  Am I Hungry? Mindful eating virtual  lucero  Www.yourweightmatters. org - Obesity Action Coalition sponsored Blog posts daily  Motivation lucero (black box with white \")- daily supportive messages sent to your phone     Books/Video Education/Podcasts  Mindless Eating by Jazmin Montes  Why We Get Sick by Rafita Chisholm (a book about insulin resistance)  Atomic Habits by Lizeth Zaragoza (a book about taking small steps to promote greater behavior change)   Can't Hurt Me by Lisa Johnson (a book exploring the power of discipline in achieving your goals)  The End of Dieting: How to Live for Life by Dr. Darcie Covarrubias M.D. or listen to The [address] Episode 61: Understanding \"Nutritarian\" Eating w/Dr. Darcie Covarrubias  Your Body in Balance: The World Fuel Services Corporation of Food, Hormones, and Health by Dr. Jonny Ortega  The Menopause Diet Plan by Miguel Cuevas and [facility]P AT [facility]  The Complete Guide to fasting by Dr. Adore Ware, [address] [facility] by Julienne Narvaez, Ph.D, R.D.   Weight Loss Surgery Will Not Treat Food Addiction by Avis Villasenor Ph.D  The [address] [place] [address] on plant based nutrition  Fed Up - documentary about obesity (Free on [place])  The Truth About Sugar - documentary on sugar (Free on Utube, https://youtu. be/8L9bmsyUF9r)  The Dr. Garcia Mixer by Dr. Glenn Anguiano MD  Fitlosophy Fitspiration - journal to better health (found at [employer] in fitness aisle)  What Happened to You?- a look at the impact trauma has on behavior written by Ernst Sanon and Dr. Luisito Thomas Again by [place] Grieves - discovering your true self after trauma  Shagufta Jordan talk on Polymita Technologies, The Call to Courage  Podcasts: The Exam Room by the Physician's Committee, Nutrition Facts by Dr. Cleo Maravilla    We are here to support you with weight loss, but please remember that you still need your primary care provider for your routine health maintenance.

## 2023-07-14 ENCOUNTER — TELEPHONE (OUTPATIENT)
Dept: INTERNAL MEDICINE CLINIC | Facility: CLINIC | Age: 48
End: 2023-07-14

## 2023-07-14 DIAGNOSIS — Z12.5 SCREENING FOR MALIGNANT NEOPLASM OF PROSTATE: ICD-10-CM

## 2023-07-14 DIAGNOSIS — Z13.220 SCREENING FOR LIPID DISORDERS: ICD-10-CM

## 2023-07-14 DIAGNOSIS — Z00.00 ROUTINE GENERAL MEDICAL EXAMINATION AT A HEALTH CARE FACILITY: Primary | ICD-10-CM

## 2023-07-14 DIAGNOSIS — Z13.29 SCREENING FOR THYROID DISORDER: ICD-10-CM

## 2023-07-14 DIAGNOSIS — Z13.0 SCREENING FOR DISORDER OF BLOOD AND BLOOD-FORMING ORGANS: ICD-10-CM

## 2023-07-14 DIAGNOSIS — Z13.228 SCREENING FOR METABOLIC DISORDER: ICD-10-CM

## 2023-07-14 RX ORDER — PHENTERMINE HYDROCHLORIDE 37.5 MG/1
37.5 TABLET ORAL
Qty: 30 TABLET | Refills: 2 | OUTPATIENT
Start: 2023-07-14

## 2023-07-14 NOTE — TELEPHONE ENCOUNTER
Informed must fast no call back required. Orders to Magaly Ro.     Future Appointments   Date Time Provider Butch Hernandez   8/15/2023  4:00 PM LUC[facility]   9/8/2023  1:00 PM Bernadette Baumann MD [facility] ECC SUB GI   9/19/2023  4:00 PM Michael Palomares MD [facility] [address] [facility]

## 2023-07-14 NOTE — TELEPHONE ENCOUNTER
Requesting   Requested Prescriptions     Pending Prescriptions Disp Refills    Phentermine HCl 37.5 MG Oral Tab 30 tablet 2     Sig: Take 1 tablet (37.5 mg total) by mouth before breakfast.     LOV: 6/6/23  RTC: 10 weeks  Filled: 6/9/23 #30 with 2 refills    Future Appointments   Date Time Provider Butch Hernandez   8/15/2023  4:00 PM LUCY Gil [facility]   9/8/2023  1:00 PM Yoly Chambers MD [facility] ECC SUB GI   9/19/2023  4:00 PM Michael Palomares MD [facility] [address] [facility]     Refill too soon

## 2023-08-01 ENCOUNTER — LAB ENCOUNTER (OUTPATIENT)
Dept: LAB | Facility: HOSPITAL | Age: 48
End: 2023-08-01
Attending: PHYSICIAN ASSISTANT
Payer: COMMERCIAL

## 2023-08-01 ENCOUNTER — HOSPITAL ENCOUNTER (OUTPATIENT)
Age: 48
Discharge: HOME | End: 2023-08-01
Payer: COMMERCIAL

## 2023-08-01 VITALS
OXYGEN SATURATION: 97 % | HEART RATE: 102 BPM | WEIGHT: 205 LBS | RESPIRATION RATE: 22 BRPM | SYSTOLIC BLOOD PRESSURE: 107 MMHG | HEIGHT: 70 IN | BODY MASS INDEX: 29.35 KG/M2 | TEMPERATURE: 98 F | DIASTOLIC BLOOD PRESSURE: 61 MMHG

## 2023-08-01 DIAGNOSIS — R19.7 DIARRHEA, UNSPECIFIED TYPE: Primary | ICD-10-CM

## 2023-08-01 DIAGNOSIS — R11.0 NAUSEA: ICD-10-CM

## 2023-08-01 DIAGNOSIS — R19.7 DIARRHEA, UNSPECIFIED TYPE: ICD-10-CM

## 2023-08-01 LAB
#MXD IC: 0.6 X10ˆ3/UL (ref 0.1–1)
BUN BLD-MCNC: 17 MG/DL (ref 7–18)
CHLORIDE BLD-SCNC: 100 MMOL/L (ref 98–112)
CO2 BLD-SCNC: 24 MMOL/L (ref 21–32)
CREAT BLD-MCNC: 1.2 MG/DL
CRYPTOSP AG STL QL IA: NEGATIVE
EGFRCR SERPLBLD CKD-EPI 2021: 74 ML/MIN/1.73M2 (ref 60–?)
G LAMBLIA AG STL QL IA: POSITIVE
GLUCOSE BLD-MCNC: 110 MG/DL (ref 70–99)
HCT VFR BLD AUTO: 45.7 %
HCT VFR BLD CALC: 45 %
HGB BLD-MCNC: 14.8 G/DL
ISTAT IONIZED CALCIUM FOR CHEM 8: 1.16 MMOL/L (ref 1.12–1.32)
LYMPHOCYTES # BLD AUTO: 0.5 X10ˆ3/UL (ref 1–4)
LYMPHOCYTES NFR BLD AUTO: 10.2 %
MCH RBC QN AUTO: 29.1 PG (ref 26–34)
MCHC RBC AUTO-ENTMCNC: 32.4 G/DL (ref 31–37)
MCV RBC AUTO: 90 FL (ref 80–100)
MIXED CELL %: 10.9 %
NEUTROPHILS # BLD AUTO: 4 X10ˆ3/UL (ref 1.5–7.7)
NEUTROPHILS NFR BLD AUTO: 78.9 %
PLATELET # BLD AUTO: 222 X10ˆ3/UL (ref 150–450)
POTASSIUM BLD-SCNC: 4 MMOL/L (ref 3.6–5.1)
RBC # BLD AUTO: 5.08 X10ˆ6/UL
SODIUM BLD-SCNC: 137 MMOL/L (ref 136–145)
WBC # BLD AUTO: 5.1 X10ˆ3/UL (ref 4–11)

## 2023-08-01 PROCEDURE — 96375 TX/PRO/DX INJ NEW DRUG ADDON: CPT

## 2023-08-01 PROCEDURE — 87272 CRYPTOSPORIDIUM AG IF: CPT

## 2023-08-01 PROCEDURE — 99215 OFFICE O/P EST HI 40 MIN: CPT

## 2023-08-01 PROCEDURE — 87493 C DIFF AMPLIFIED PROBE: CPT

## 2023-08-01 PROCEDURE — 99204 OFFICE O/P NEW MOD 45 MIN: CPT

## 2023-08-01 PROCEDURE — 87329 GIARDIA AG IA: CPT

## 2023-08-01 PROCEDURE — 87046 STOOL CULTR AEROBIC BACT EA: CPT

## 2023-08-01 PROCEDURE — 80047 BASIC METABLC PNL IONIZED CA: CPT

## 2023-08-01 PROCEDURE — 96374 THER/PROPH/DIAG INJ IV PUSH: CPT

## 2023-08-01 PROCEDURE — 87427 SHIGA-LIKE TOXIN AG IA: CPT

## 2023-08-01 PROCEDURE — 87045 FECES CULTURE AEROBIC BACT: CPT

## 2023-08-01 PROCEDURE — 85025 COMPLETE CBC W/AUTO DIFF WBC: CPT | Performed by: PHYSICIAN ASSISTANT

## 2023-08-01 PROCEDURE — 82272 OCCULT BLD FECES 1-3 TESTS: CPT

## 2023-08-01 PROCEDURE — 96361 HYDRATE IV INFUSION ADD-ON: CPT

## 2023-08-01 RX ORDER — KETOROLAC TROMETHAMINE 30 MG/ML
15 INJECTION, SOLUTION INTRAMUSCULAR; INTRAVENOUS ONCE
Status: COMPLETED | OUTPATIENT
Start: 2023-08-01 | End: 2023-08-01

## 2023-08-01 RX ORDER — SODIUM CHLORIDE 9 MG/ML
1000 INJECTION, SOLUTION INTRAVENOUS ONCE
Status: COMPLETED | OUTPATIENT
Start: 2023-08-01 | End: 2023-08-01

## 2023-08-01 RX ORDER — ONDANSETRON 4 MG/1
4 TABLET, ORALLY DISINTEGRATING ORAL EVERY 4 HOURS PRN
Qty: 10 TABLET | Refills: 0 | Status: SHIPPED | OUTPATIENT
Start: 2023-08-01 | End: 2023-08-08

## 2023-08-01 RX ORDER — ONDANSETRON 2 MG/ML
4 INJECTION INTRAMUSCULAR; INTRAVENOUS ONCE
Status: COMPLETED | OUTPATIENT
Start: 2023-08-01 | End: 2023-08-01

## 2023-08-01 NOTE — ED INITIAL ASSESSMENT (HPI)
Pt here w/ c/o diarrhea almost every 10 min, nausea, fever 102 onset last night. Abd pain and cramping.

## 2023-08-01 NOTE — DISCHARGE INSTRUCTIONS
Adequate hydration with water, Pedialyte or Gatorade zero   Continue to monitor input and output of fluids   Urine should be a light yellow color   BRAT diet emphasized - Bananas, Rice, Applesauce and Toast for the next 48 hours and then advance diet slowly   Take an over the counter Probiotic such as Culturelle OR Florastor

## 2023-08-02 LAB — C DIFF TOX B STL QL: NEGATIVE

## 2023-08-02 RX ORDER — METRONIDAZOLE 250 MG/1
250 TABLET ORAL 3 TIMES DAILY
Qty: 21 TABLET | Refills: 0 | Status: SHIPPED | OUTPATIENT
Start: 2023-08-02 | End: 2023-08-09

## 2023-08-02 NOTE — ED NOTES
Patient called and given labresults ie + giardia. Prescribed flagyl. He is feeling slightly better. He is hydrating with pedialyte and taking probiotic. He will follow up with PCP as needed.

## 2023-08-08 ENCOUNTER — TELEPHONE (OUTPATIENT)
Dept: INTERNAL MEDICINE CLINIC | Facility: CLINIC | Age: 48
End: 2023-08-08

## 2023-08-10 ENCOUNTER — OFFICE VISIT (OUTPATIENT)
Dept: INTERNAL MEDICINE CLINIC | Facility: CLINIC | Age: 48
End: 2023-08-10
Payer: COMMERCIAL

## 2023-08-10 VITALS
BODY MASS INDEX: 29.92 KG/M2 | HEART RATE: 74 BPM | HEIGHT: 70 IN | TEMPERATURE: 97 F | WEIGHT: 209 LBS | DIASTOLIC BLOOD PRESSURE: 72 MMHG | RESPIRATION RATE: 18 BRPM | OXYGEN SATURATION: 98 % | SYSTOLIC BLOOD PRESSURE: 118 MMHG

## 2023-08-10 DIAGNOSIS — A07.1 GIARDIASIS: ICD-10-CM

## 2023-08-10 DIAGNOSIS — R19.7 ACUTE DIARRHEA: Primary | ICD-10-CM

## 2023-08-10 DIAGNOSIS — K64.9 HEMORRHOIDS, UNSPECIFIED HEMORRHOID TYPE: ICD-10-CM

## 2023-08-10 PROCEDURE — 3078F DIAST BP <80 MM HG: CPT | Performed by: INTERNAL MEDICINE

## 2023-08-10 PROCEDURE — 3074F SYST BP LT 130 MM HG: CPT | Performed by: INTERNAL MEDICINE

## 2023-08-10 PROCEDURE — 3008F BODY MASS INDEX DOCD: CPT | Performed by: INTERNAL MEDICINE

## 2023-08-10 PROCEDURE — 99213 OFFICE O/P EST LOW 20 MIN: CPT | Performed by: INTERNAL MEDICINE

## 2023-08-15 ENCOUNTER — OFFICE VISIT (OUTPATIENT)
Dept: INTERNAL MEDICINE CLINIC | Facility: CLINIC | Age: 48
End: 2023-08-15
Payer: COMMERCIAL

## 2023-08-15 VITALS
RESPIRATION RATE: 20 BRPM | WEIGHT: 207 LBS | HEIGHT: 70 IN | BODY MASS INDEX: 29.63 KG/M2 | DIASTOLIC BLOOD PRESSURE: 74 MMHG | HEART RATE: 96 BPM | SYSTOLIC BLOOD PRESSURE: 124 MMHG

## 2023-08-15 DIAGNOSIS — E66.9 OBESITY (BMI 30-39.9): ICD-10-CM

## 2023-08-15 DIAGNOSIS — E78.5 HYPERLIPIDEMIA, UNSPECIFIED HYPERLIPIDEMIA TYPE: ICD-10-CM

## 2023-08-15 DIAGNOSIS — Z51.81 THERAPEUTIC DRUG MONITORING: Primary | ICD-10-CM

## 2023-08-15 DIAGNOSIS — E55.9 VITAMIN D DEFICIENCY: ICD-10-CM

## 2023-08-15 DIAGNOSIS — R73.03 PREDIABETES: ICD-10-CM

## 2023-08-15 PROCEDURE — 99213 OFFICE O/P EST LOW 20 MIN: CPT | Performed by: NURSE PRACTITIONER

## 2023-08-15 PROCEDURE — 3008F BODY MASS INDEX DOCD: CPT | Performed by: NURSE PRACTITIONER

## 2023-08-15 PROCEDURE — 3078F DIAST BP <80 MM HG: CPT | Performed by: NURSE PRACTITIONER

## 2023-08-15 PROCEDURE — 3074F SYST BP LT 130 MM HG: CPT | Performed by: NURSE PRACTITIONER

## 2023-08-15 NOTE — PATIENT INSTRUCTIONS
Next steps:  1. Fill your prescribed medication and take as discussed and prescribed: phentermine   2. Schedule a personal nutrition consultation with one of our registered dieticians       1. Drink water with meals and throughout the day, cut down on soda and/or juice if consumed. Consider flavored water options like Bubbly, Spindrift, Hint and Jordi. 2.  Eat breakfast daily and focus on having protein with each meal, examples include: greek yogurt, cottage cheese, hard boiled egg, whole grain toast with peanut butter. 3.  Reduce refined carbohydrates and sugars which includes items such as sweets, as well as rice, pasta, and bread and make sure to choose whole grain options when having them with just 1 serving per meal about the size of your inner palm. 4.  Consume non starchy veggies daily working towards making them a good 50% of your daily food intake. Add them to lunch and dinner consistently. 5.  Start a daily probiotic: VSL#3 is recommended, (order on line at www.vsl3. com). Take 1 capsule daily with water for 30 days, then reduce to 1 every other day (this will reduce the cost). Capsules can be left out for 2 weeks, but then must be refrigerated. Please download ulices My Fitness Kimberly Scot! Or Net Diary to monitor daily dietary intake and you will be able to see if you are eating the right amount of calories or too much or too little which would hinder weight loss. Additionally this will help to see your daily carbohydrate and protein intake. When you set the ulices up choose 1-2 lbs/week as a goal.  Keeping a paper food journal is an option as well to remain accountable for your choices- this is the start to mindful eating! A low calorie diet has been consistently shown to support weight loss. Continue or start exercising to help establish a routine. If not already exercising begin with 1 day and progress as able with long-term goal of 30 minutes 5 days a week at a minimum.      Meditation daily can help manage and control stress. Chronic stress can make weight loss difficult. Exercising is one way to help with stress, but meditation using the CALM Lucero or another comparable alternative can be done in your home or place of work with little time commitment. This Lucero can also help work on behavior change and improve sleep. Check out the segment under Calm Masterclass and listen to The 4 Pillars of Health. A great way to begin learning about the foundation of lifestyle with practical tips to use in your every day. Check out www.yourweightmatters. org blog for continued daily support and education along this weight loss journey! Patient Resources:     Personal Training/Fitness Classes/Health Coaching     Mihir 112 and Lemon Sophiaside @ [email or web address] Full fitness center with group fitness and personal training. Discount available as client of [facility]. Health Coaching and Personal Training with [facility]- individual weekly coaching with option to add personal training and small group fitness classes targeted at weight loss- [phone] and/or email @ [email or web address]  360FIT [place] [email or web address] Group Fitness [phone] and/or email Gokul Howell at [email or web address]  [address] with multiple locations: Aetna (www.Blucarat), Ticketfly The Spare Change Payments (www.Hotelzilla. mig33), Fit Body Bootcamp (www.Autobook NowbodybootPureSignCop.mig33), CAIS (www.SocialThreader), The Exercise  (www.exercisecoach.mig33)     Online Fitness  Fitness  on Whole Foods in 10 DVD series- www. smhbs75LUX. mig33  Sit and Be Fit - Chair exercise series Www.sitandbefit. org  Hip Hop Fit with Hunter Simmons at www.hiphopfit. net     Apps for on the [employer] 7 Minute Workout (orange box with white 7) - free on the go HIIT training lucero  Peloton Lucero @ www. Corthera     Nutrition Trackers and Tools  LoseIT! And My Fitness Pal apps and on line for tracking nutrition  NOOM - virtual health coaching  FitFoundation (healthy meals on the go) in [facility] @ www. flxhkfeanouqz1j. Jessica Monson MD @ www.Xangatid.com and Pawel Grooms (keto and low carb plans recommended) @ www. [email or web address].WIH, Metabolic Meals @ www. MyMetabolicMeals. com - individual prepared meals to go  Xymogen, Synthetic Genomics, International Business Machines, Every Plate, Athlete Builder- on line meal delivery programs for preparation at home  AK ParentsWare in [place] for homemade meals to go @ wwwSallaty For Technology  Diet Doctor @ www. dietdoctor. Adility - low carb swaps  Yummly - meal prep and planning lucero (www.yummly. com)     Stress Management/Behavior/Mindful Eating  CALM meditation lucero (www.Curis)  Headspace  Am I Hungry? Mindful eating virtual  lucero  Www.yourweightmatters. org - Obesity Action Coalition sponsored Blog posts daily  Motivation lucero (black box with white \")- daily supportive messages sent to your phone     Books/Video Education/Podcasts  Mindless Eating by Miguel A Keller  Why We Get Sick by Bonfaire (a book about insulin resistance)  Atomic Habits by [facility] (a book about taking small steps to promote greater behavior change)   Can't Hurt Me by Marques Callahan (a book exploring the power of discipline in achieving your goals)  The End of Dieting: How to Live for Life by Dr. Jabier Higgins M.D. or listen to The [address] [facility] Episode 61: Understanding \"Nutritarian\" Eating w/Dr. Jabier Higgins  Your Body in Balance: The World Fuel Services Corporation of Food, Hormones, and Health by Dr. Jen Fleischer  The Menopause Diet Plan by Jayashree Gilmore and [facility] AT [facility]  The Complete Guide to fasting by Dr. Delmer Weldon, [facility] by Gloria Washburn, Ph.D, R.D.   Weight Loss Surgery Will Not Treat Food Addiction by Norman Riedel Ph.D  The Game Changers- Angelicdavid Documentary on plant based nutrition  Fed Up - documentary about obesity (Free on Utube)  The Truth About Sugar - documentary on sugar (Free on Utube, https://youtu. be/2Q1sbpxZS3u)  The Dr. Yolis Koch by Dr. Jammie Chou MD  Fitlosophy Fitspiration - journal to better health (found at [employer] in fitness aisle)  What Happened to You?- a look at the impact trauma has on behavior written by Rachana Guadalupe and Dr. Jiang Pick Again by Archie Heath - discovering your true self after trauma  Lesia Monteiro talk on InstaJob, The Call to [place] Temperature Management  Podcasts: The Exam Room by the Physician's Committee, Nutrition Facts by Dr. Jennifer Braswell    We are here to support you with weight loss, but please remember that you still need your primary care provider for your routine health maintenance.

## 2023-08-28 ENCOUNTER — APPOINTMENT (OUTPATIENT)
Dept: CT IMAGING | Facility: HOSPITAL | Age: 48
End: 2023-08-28
Attending: EMERGENCY MEDICINE
Payer: COMMERCIAL

## 2023-08-28 ENCOUNTER — HOSPITAL ENCOUNTER (EMERGENCY)
Facility: HOSPITAL | Age: 48
Discharge: HOME | End: 2023-08-28
Attending: EMERGENCY MEDICINE
Payer: COMMERCIAL

## 2023-08-28 VITALS
SYSTOLIC BLOOD PRESSURE: 115 MMHG | DIASTOLIC BLOOD PRESSURE: 62 MMHG | HEART RATE: 66 BPM | HEIGHT: 70.6 IN | RESPIRATION RATE: 18 BRPM | BODY MASS INDEX: 29.02 KG/M2 | OXYGEN SATURATION: 98 % | TEMPERATURE: 99 F | WEIGHT: 205 LBS

## 2023-08-28 DIAGNOSIS — M54.81 CERVICO-OCCIPITAL NEURALGIA OF RIGHT SIDE: Primary | ICD-10-CM

## 2023-08-28 LAB
ALBUMIN SERPL-MCNC: 3.2 G/DL (ref 3.4–5)
ALBUMIN/GLOB SERPL: 0.9 {RATIO} (ref 1–2)
ALP LIVER SERPL-CCNC: 58 U/L
ALT SERPL-CCNC: 20 U/L
ANION GAP SERPL CALC-SCNC: 5 MMOL/L (ref 0–18)
AST SERPL-CCNC: 15 U/L (ref 15–37)
BASOPHILS # BLD AUTO: 0.09 X10(3) UL (ref 0–0.2)
BASOPHILS NFR BLD AUTO: 0.8 %
BILIRUB SERPL-MCNC: 0.5 MG/DL (ref 0.1–2)
BUN BLD-MCNC: 16 MG/DL (ref 7–18)
CALCIUM BLD-MCNC: 8.7 MG/DL (ref 8.5–10.1)
CHLORIDE SERPL-SCNC: 109 MMOL/L (ref 98–112)
CO2 SERPL-SCNC: 25 MMOL/L (ref 21–32)
CREAT BLD-MCNC: 1.15 MG/DL
EGFRCR SERPLBLD CKD-EPI 2021: 78 ML/MIN/1.73M2 (ref 60–?)
EOSINOPHIL # BLD AUTO: 0.47 X10(3) UL (ref 0–0.7)
EOSINOPHIL NFR BLD AUTO: 4.4 %
ERYTHROCYTE [DISTWIDTH] IN BLOOD BY AUTOMATED COUNT: 11.7 %
GLOBULIN PLAS-MCNC: 3.4 G/DL (ref 2.8–4.4)
GLUCOSE BLD-MCNC: 102 MG/DL (ref 70–99)
HCT VFR BLD AUTO: 37.2 %
HGB BLD-MCNC: 12.1 G/DL
IMM GRANULOCYTES # BLD AUTO: 0.04 X10(3) UL (ref 0–1)
IMM GRANULOCYTES NFR BLD: 0.4 %
LYMPHOCYTES # BLD AUTO: 2.26 X10(3) UL (ref 1–4)
LYMPHOCYTES NFR BLD AUTO: 20.9 %
MCH RBC QN AUTO: 29 PG (ref 26–34)
MCHC RBC AUTO-ENTMCNC: 32.5 G/DL (ref 31–37)
MCV RBC AUTO: 89.2 FL
MONOCYTES # BLD AUTO: 1.03 X10(3) UL (ref 0.1–1)
MONOCYTES NFR BLD AUTO: 9.5 %
NEUTROPHILS # BLD AUTO: 6.91 X10 (3) UL (ref 1.5–7.7)
NEUTROPHILS # BLD AUTO: 6.91 X10(3) UL (ref 1.5–7.7)
NEUTROPHILS NFR BLD AUTO: 64 %
OSMOLALITY SERPL CALC.SUM OF ELEC: 289 MOSM/KG (ref 275–295)
PLATELET # BLD AUTO: 282 10(3)UL (ref 150–450)
POTASSIUM SERPL-SCNC: 4 MMOL/L (ref 3.5–5.1)
PROT SERPL-MCNC: 6.6 G/DL (ref 6.4–8.2)
RBC # BLD AUTO: 4.17 X10(6)UL
SARS-COV-2 RNA RESP QL NAA+PROBE: NOT DETECTED
SODIUM SERPL-SCNC: 139 MMOL/L (ref 136–145)
WBC # BLD AUTO: 10.8 X10(3) UL (ref 4–11)

## 2023-08-28 PROCEDURE — 70450 CT HEAD/BRAIN W/O DYE: CPT | Performed by: EMERGENCY MEDICINE

## 2023-08-28 PROCEDURE — 96361 HYDRATE IV INFUSION ADD-ON: CPT

## 2023-08-28 PROCEDURE — 96375 TX/PRO/DX INJ NEW DRUG ADDON: CPT

## 2023-08-28 PROCEDURE — 80053 COMPREHEN METABOLIC PANEL: CPT | Performed by: EMERGENCY MEDICINE

## 2023-08-28 PROCEDURE — 99285 EMERGENCY DEPT VISIT HI MDM: CPT

## 2023-08-28 PROCEDURE — 85025 COMPLETE CBC W/AUTO DIFF WBC: CPT | Performed by: EMERGENCY MEDICINE

## 2023-08-28 PROCEDURE — 96374 THER/PROPH/DIAG INJ IV PUSH: CPT

## 2023-08-28 PROCEDURE — 99284 EMERGENCY DEPT VISIT MOD MDM: CPT

## 2023-08-28 RX ORDER — NAPROXEN 500 MG/1
500 TABLET ORAL 2 TIMES DAILY PRN
Qty: 20 TABLET | Refills: 0 | Status: SHIPPED | OUTPATIENT
Start: 2023-08-28 | End: 2023-09-04

## 2023-08-28 RX ORDER — DIAZEPAM 5 MG/ML
2.5 INJECTION, SOLUTION INTRAMUSCULAR; INTRAVENOUS ONCE
Status: COMPLETED | OUTPATIENT
Start: 2023-08-28 | End: 2023-08-28

## 2023-08-28 RX ORDER — CYCLOBENZAPRINE HCL 10 MG
10 TABLET ORAL 3 TIMES DAILY PRN
Qty: 20 TABLET | Refills: 0 | Status: SHIPPED | OUTPATIENT
Start: 2023-08-28 | End: 2023-09-04

## 2023-08-28 RX ORDER — KETOROLAC TROMETHAMINE 15 MG/ML
30 INJECTION, SOLUTION INTRAMUSCULAR; INTRAVENOUS ONCE
Status: COMPLETED | OUTPATIENT
Start: 2023-08-28 | End: 2023-08-28

## 2023-08-28 NOTE — ED INITIAL ASSESSMENT (HPI)
Neck pain since Friday am, pain radiating up to back of head. Pt unable to turn head side to side due to increased pain, uncomfortable laying in bed. Last dose motrin at 2230.  Speech clear, HARO x 4

## 2023-08-30 ENCOUNTER — TELEPHONE (OUTPATIENT)
Dept: INTERNAL MEDICINE CLINIC | Facility: CLINIC | Age: 48
End: 2023-08-30

## 2023-09-01 ENCOUNTER — TELEMEDICINE (OUTPATIENT)
Dept: INTERNAL MEDICINE CLINIC | Facility: CLINIC | Age: 48
End: 2023-09-01
Payer: COMMERCIAL

## 2023-09-01 DIAGNOSIS — M1A.00X0 IDIOPATHIC CHRONIC GOUT WITHOUT TOPHUS, UNSPECIFIED SITE: ICD-10-CM

## 2023-09-01 DIAGNOSIS — M10.9 ACUTE GOUT OF RIGHT FOOT, UNSPECIFIED CAUSE: Primary | ICD-10-CM

## 2023-09-01 RX ORDER — METHYLPREDNISOLONE 4 MG/1
TABLET ORAL
Qty: 1 EACH | Refills: 0 | Status: SHIPPED | OUTPATIENT
Start: 2023-09-01

## 2023-09-19 ENCOUNTER — OFFICE VISIT (OUTPATIENT)
Dept: INTERNAL MEDICINE CLINIC | Facility: CLINIC | Age: 48
End: 2023-09-19
Payer: COMMERCIAL

## 2023-09-19 VITALS
RESPIRATION RATE: 18 BRPM | WEIGHT: 210.81 LBS | HEART RATE: 90 BPM | HEIGHT: 70 IN | DIASTOLIC BLOOD PRESSURE: 72 MMHG | TEMPERATURE: 97 F | SYSTOLIC BLOOD PRESSURE: 122 MMHG | OXYGEN SATURATION: 99 % | BODY MASS INDEX: 30.18 KG/M2

## 2023-09-19 DIAGNOSIS — A07.1 GIARDIASIS: ICD-10-CM

## 2023-09-19 DIAGNOSIS — K52.9 CHRONIC DIARRHEA: ICD-10-CM

## 2023-09-19 DIAGNOSIS — R73.03 PREDIABETES: ICD-10-CM

## 2023-09-19 DIAGNOSIS — I83.892 VARICOSE VEINS OF LEFT LOWER EXTREMITY WITH EDEMA: ICD-10-CM

## 2023-09-19 DIAGNOSIS — E78.5 DYSLIPIDEMIA: ICD-10-CM

## 2023-09-19 DIAGNOSIS — Z00.00 ANNUAL PHYSICAL EXAM: Primary | ICD-10-CM

## 2023-09-19 DIAGNOSIS — M1A.00X0 IDIOPATHIC CHRONIC GOUT WITHOUT TOPHUS, UNSPECIFIED SITE: ICD-10-CM

## 2023-09-19 DIAGNOSIS — E66.9 CLASS 1 OBESITY: ICD-10-CM

## 2023-09-19 DIAGNOSIS — M10.9 ACUTE GOUT OF RIGHT FOOT, UNSPECIFIED CAUSE: ICD-10-CM

## 2023-09-19 PROCEDURE — 3008F BODY MASS INDEX DOCD: CPT | Performed by: INTERNAL MEDICINE

## 2023-09-19 PROCEDURE — 3074F SYST BP LT 130 MM HG: CPT | Performed by: INTERNAL MEDICINE

## 2023-09-19 PROCEDURE — 3078F DIAST BP <80 MM HG: CPT | Performed by: INTERNAL MEDICINE

## 2023-09-19 PROCEDURE — 99396 PREV VISIT EST AGE 40-64: CPT | Performed by: INTERNAL MEDICINE

## 2023-09-23 LAB
AMB EXT BILIRUBIN, TOTAL: 0.5 MG/DL
AMB EXT BUN: 16 MG/DL
AMB EXT CALCIUM: 9.6
AMB EXT CARBON DIOXIDE: 23
AMB EXT CHLORIDE: 101
AMB EXT CHOL/HDL RATIO: 3.1
AMB EXT CHOLESTEROL, TOTAL: 225 MG/DL
AMB EXT CMP ALT: 14 U/L
AMB EXT CMP AST: 16 U/L
AMB EXT CREATININE: 1.04 MG/DL
AMB EXT GLUCOSE: 102 MG/DL
AMB EXT HDL CHOLESTEROL: 73 MG/DL
AMB EXT HEMATOCRIT: 40.5
AMB EXT HEMOGLOBIN: 13.2
AMB EXT HGBA1C: 5.7 %
AMB EXT LDL CHOLESTEROL, DIRECT: 144 MG/DL
AMB EXT MCV: 88
AMB EXT PLATELETS: 349
AMB EXT POSTASSIUM: 4.6 MMOL/L
AMB EXT SODIUM: 140 MMOL/L
AMB EXT TOTAL PROTEIN: 7.2
AMB EXT TRIGLYCERIDES: 45 MG/DL
AMB EXT WBC: 7.4 X10(3)UL

## 2023-09-26 ENCOUNTER — PATIENT MESSAGE (OUTPATIENT)
Dept: INTERNAL MEDICINE CLINIC | Facility: CLINIC | Age: 48
End: 2023-09-26

## 2023-09-26 DIAGNOSIS — R73.03 PREDIABETES: ICD-10-CM

## 2023-09-26 DIAGNOSIS — E78.00 PURE HYPERCHOLESTEROLEMIA: Primary | ICD-10-CM

## 2023-09-27 NOTE — TELEPHONE ENCOUNTER
LOV 9/19/23 with AD. AD, please see pt's Wellness labs attached. Pt asking if he should be retested for Giardia. Please advise appt or orders?

## 2023-09-29 NOTE — TELEPHONE ENCOUNTER
Prediabetes again seen. As this is mild I recommend continue dietary and lifestyle efforts, with repeat HbA1c in 3 months. Okay to continue to hold metformin. Significant elevation in LDL choletserol: again improve diet and repeat lipid panel in 3 months: if remains elevated will recommend medical management.

## 2023-10-13 NOTE — TELEPHONE ENCOUNTER
FBS is 102 and HbA1c of 5.7%: continue dietary and lifestyle efforts, and regular follow-up with weight loss clinic. LDL is 144: improving, but still elevated. Atorvastatin 10 mg daily recommended in light of degree and chronicity of elevation: if agreeable will send.    Vitamin D insufficiency: initiate OTC D3 2000 units daily  Follow-up in six months for repeat labs and discussion

## 2023-10-16 ENCOUNTER — TELEPHONE (OUTPATIENT)
Dept: INTERNAL MEDICINE CLINIC | Facility: CLINIC | Age: 48
End: 2023-10-16

## 2023-10-20 RX ORDER — PHENTERMINE HYDROCHLORIDE 37.5 MG/1
37.5 TABLET ORAL
Qty: 30 TABLET | Refills: 2 | Status: SHIPPED | OUTPATIENT
Start: 2023-10-20

## 2023-10-20 NOTE — TELEPHONE ENCOUNTER
Requesting   Requested Prescriptions     Pending Prescriptions Disp Refills    PHENTERMINE HCL 37.5 MG Oral Tab [Pharmacy Med Name: phentermine 37.5 mg tablet] 30 tablet 2     Sig: TAKE ONE TABLET BY MOUTH BEFORE breakfast     LOV: 8/15/23  RTC: 3 months  Filled: 6/9/23 #30 with 2 refills    Future Appointments   Date Time Provider Butch Hernandez   10/26/2023  7:00 AM Dee Hdez [address]   11/20/2023  4:20 PM Jose Castillo, LUCY EMGHANK [facility] [address]

## 2023-10-21 RX ORDER — PHENTERMINE HYDROCHLORIDE 37.5 MG/1
37.5 TABLET ORAL
Qty: 30 TABLET | Refills: 2 | OUTPATIENT
Start: 2023-10-21

## 2023-10-26 ENCOUNTER — HOSPITAL ENCOUNTER (OUTPATIENT)
Dept: ULTRASOUND IMAGING | Facility: HOSPITAL | Age: 48
Discharge: HOME | End: 2023-10-26
Attending: INTERNAL MEDICINE

## 2023-10-26 DIAGNOSIS — I83.892 VARICOSE VEINS OF LEFT LOWER EXTREMITY WITH EDEMA: ICD-10-CM

## 2023-10-26 PROCEDURE — 93970 EXTREMITY STUDY: CPT | Performed by: INTERNAL MEDICINE

## 2023-10-27 ENCOUNTER — TELEPHONE (OUTPATIENT)
Dept: INTERNAL MEDICINE CLINIC | Facility: CLINIC | Age: 48
End: 2023-10-27

## 2023-11-20 ENCOUNTER — OFFICE VISIT (OUTPATIENT)
Dept: INTERNAL MEDICINE CLINIC | Facility: CLINIC | Age: 48
End: 2023-11-20
Payer: COMMERCIAL

## 2023-11-20 VITALS
RESPIRATION RATE: 18 BRPM | DIASTOLIC BLOOD PRESSURE: 82 MMHG | SYSTOLIC BLOOD PRESSURE: 122 MMHG | HEART RATE: 88 BPM | WEIGHT: 211 LBS | BODY MASS INDEX: 30.21 KG/M2 | HEIGHT: 70 IN

## 2023-11-20 DIAGNOSIS — Z51.81 THERAPEUTIC DRUG MONITORING: Primary | ICD-10-CM

## 2023-11-20 DIAGNOSIS — E55.9 VITAMIN D DEFICIENCY: ICD-10-CM

## 2023-11-20 DIAGNOSIS — E66.9 OBESITY (BMI 30-39.9): ICD-10-CM

## 2023-11-20 DIAGNOSIS — E78.5 HYPERLIPIDEMIA, UNSPECIFIED HYPERLIPIDEMIA TYPE: ICD-10-CM

## 2023-11-20 DIAGNOSIS — R73.03 PREDIABETES: ICD-10-CM

## 2023-11-20 PROCEDURE — 3079F DIAST BP 80-89 MM HG: CPT | Performed by: NURSE PRACTITIONER

## 2023-11-20 PROCEDURE — 3074F SYST BP LT 130 MM HG: CPT | Performed by: NURSE PRACTITIONER

## 2023-11-20 PROCEDURE — 3008F BODY MASS INDEX DOCD: CPT | Performed by: NURSE PRACTITIONER

## 2023-11-20 PROCEDURE — 99213 OFFICE O/P EST LOW 20 MIN: CPT | Performed by: NURSE PRACTITIONER

## 2023-11-20 NOTE — PATIENT INSTRUCTIONS
Next steps:  1. Fill your prescribed medication and take as discussed and prescribed: phentermine  Can check out new medication coming out next month- called zepbound   2. Schedule a personal nutrition consultation with one of our registered dieticians     Please try to work on the following dietary changes:  Daily protein recommendation to start:  grams  Daily carbohydrate: <140g  Daily calories: 1,600-1,700  1. Drink water with meals and throughout the day, cut down on soda and/or juice if consumed. Consider flavored water options like Bubbly, Spindrift, Hint and Jordi. 2.  Eat breakfast daily and focus on having protein with each meal, examples include: greek yogurt, cottage cheese, hard boiled egg, whole grain toast with peanut butter. 3.  Reduce refined carbohydrates and sugars which includes items such as sweets, as well as rice, pasta, and bread and make sure to choose whole grain options when having them with just 1 serving per meal about the size of your inner palm. 4.  Consume non starchy veggies daily working towards making them a good 50% of your daily food intake. Add them to lunch and dinner consistently. 5.  Start a daily probiotic: VSL#3 is recommended, (order on line at www.vsl3. com). Take 1 capsule daily with water for 30 days, then reduce to 1 every other day (this will reduce the cost). Capsules can be left out for 2 weeks, but then must be refrigerated. Please download ulices My Fitness Charles Ricardo! Or Net Diary to monitor daily dietary intake and you will be able to see if you are eating the right amount of calories or too much or too little which would hinder weight loss. Additionally this will help to see your daily carbohydrate and protein intake. When you set the ulices up choose 1-2 lbs/week as a goal.  Keeping a paper food journal is an option as well to remain accountable for your choices- this is the start to mindful eating!  A low calorie diet has been consistently shown to support weight loss. Continue or start exercising to help establish a routine. If not already exercising begin with 1 day and progress as able with long-term goal of 30 minutes 5 days a week at a minimum. Meditation daily can help manage and control stress. Chronic stress can make weight loss difficult. Exercising is one way to help with stress, but meditation using the CALM Lucero or another comparable alternative can be done in your home or place of work with little time commitment. This Lucero can also help work on behavior change and improve sleep. Check out the segment under Calm Masterclass and listen to The 4 Pillars of Health. A great way to begin learning about the foundation of lifestyle with practical tips to use in your every day. Check out www.yourweightmatters. org blog for continued daily support and education along this weight loss journey! Patient Resources:     Personal Training/Fitness Classes/Health Coaching     Mihir Diallo and Lemon Sophiaside @ [email or web address]/ Full fitness center with group fitness and personal training. Discount available as client of [facility]. Health Coaching and Personal Training with Sylvester Lee at our [facility]- individual weekly coaching with option to add personal training and small group fitness classes targeted at weight loss- [phone] and/or email @ [email or web address] [email or web address]  360FIT [place] [email or web address] Group Fitness [phone] and/or email Julienne Caldera at [email or web address]  [address] [facility] with multiple locations: Aetna (www.Blue Triangle Technologies. ToyTalk), Eat The Frog Fitness (www.Samesurf. ToyTalk), Fit Body Bootcamp (www.Getonicbodyboot[address] Thirdp.ToyTalk), "Rant, Inc." Fitness (www.Kulv Travel Agency. ToyTalk), The Exercise  (www.exercisecoach.ToyTalk)     Online Fitness  Fitness  on Whole Foods in 10 DVD series- www. rdewe40CNC. ToyTalk  Sit and Be Fit - Chair exercise series Www.sitandbefit. org  Hip Hop Fit with Hunter Simmons at www.hiphopfit. net     Apps for on the Ad[employer]rium Inc 7 Minute Workout (orange box with white 7) - free on the go HIIT training lucero  Peloton Lucero @ Kollabora     Nutrition Trackers and Tools  LoseIT! And My Fitness Pal apps and on line for tracking nutrition  NOOM - virtual health coaching  FitFoundation (healthy meals on the go) in [facility]-SCI @ www. zpyihwpdklegg3a. Pervis William ORTEZ @ www.bistromdIntellitactics and Simeon Reid (keto and low carb plans recommended) @ www. JOQLQG22.OWP, Metabolic Meals @ www. MyMetabolicMeals. com - individual prepared meals to go  China WebEdu Technology, Coda [employer]otive, International Business Machines, Every Plate, Matchalarm- on line meal delivery programs for preparation at home  AK GeneAssess in [place] for homemade meals to go @ www.aSmallWorld. Ecogii Energy Labs  Diet Doctor @ www. dietdoctor. com - low carb swaps  Yummly - meal prep and planning lucero (www.yummly. com)     Stress Management/Behavior/Mindful Eating  CALM meditation lucero (www.calmWorkbooks)  Headspace  Am I Hungry? Mindful eating virtual  lucero  Www.yourweightmatters. org - Obesity Action Coalition sponsored Blog posts daily  Motivation lucero (black box with white \")- daily supportive messages sent to your phone     Books/Video Education/Podcasts  Mindless Eating by Sofia Almeida  Why We Get Sick by Ezequiel Chavez (a book about insulin resistance)  Atomic Habits by Dany Grier (a book about taking small steps to promote greater behavior change)   Can't Hurt Me by Lisa Jules (a book exploring the power of discipline in achieving your goals)  The End of Dieting: How to Live for Life by Dr. Hanna Desir M.D. or listen to The [address] Episode 61: Understanding \"Nutritarian\" Eating w/Dr. Hanna Desir  Your Body in Balance:  The World Fuel Services Corporation of Food, Hormones, and Health by Dr. Lynda Cortez  The Menopause Diet Plan by Amrik Lan and [facility] HOSP AT [facility]  The Complete Guide to fasting by  Augusto  Sugar, Salt & Fat by Rhett Jurado, Ph.D, R.D. Weight Loss Surgery Will Not Treat Food Addiction by Severiano Gordon Ph.D  The [address] [place] [address] on plant based nutrition  Fed Up - documentary about obesity (Free on [facility])  The Truth About Sugar - documentary on sugar (Free on Utube, https://youtu. be/5K2ptsqDX6t)  The Dr. Dolores Osborne by Dr. Caprice Simmonds, MD  Fitlosophy Fitspiration - journal to better health (found at [employer] in fitness aisle)  What Happened to You?- a look at the impact trauma has on behavior written by Brian Morris and Dr. Moises Draper Again by [facility] - discovering your true self after trauma  Jacoby Chadwick talk on "LifeSize, a Division of Logitech", The Call to Courage  Podcasts: The Exam Room by the Physician's Committee, Nutrition Facts by Dr. Whalen Other    We are here to support you with weight loss, but please remember that you still need your primary care provider for your routine health maintenance.

## 2024-01-18 DIAGNOSIS — E66.9 OBESITY (BMI 30-39.9): Primary | ICD-10-CM

## 2024-01-19 RX ORDER — PHENTERMINE HYDROCHLORIDE 37.5 MG/1
37.5 TABLET ORAL
Qty: 30 TABLET | Refills: 2 | Status: SHIPPED | OUTPATIENT
Start: 2024-01-19

## 2024-01-19 NOTE — TELEPHONE ENCOUNTER
Requesting   Requested Prescriptions     Pending Prescriptions Disp Refills    PHENTERMINE HCL 37.5 MG Oral Tab [Pharmacy Med Name: phentermine 37.5 mg tablet] 30 tablet 2     Sig: TAKE ONE TABLET BY MOUTH BEFORE breakfast     LOV: 11/20/23  RTC: 3 months  Filled: 10/20/23 #30 with 2 refills    No future appointments.

## 2024-04-07 ENCOUNTER — OFFICE VISIT (OUTPATIENT)
Dept: FAMILY MEDICINE CLINIC | Facility: CLINIC | Age: 49
End: 2024-04-07
Payer: COMMERCIAL

## 2024-04-07 VITALS
DIASTOLIC BLOOD PRESSURE: 86 MMHG | BODY MASS INDEX: 30.92 KG/M2 | WEIGHT: 216 LBS | HEART RATE: 91 BPM | RESPIRATION RATE: 16 BRPM | HEIGHT: 70 IN | OXYGEN SATURATION: 99 % | SYSTOLIC BLOOD PRESSURE: 150 MMHG | TEMPERATURE: 98 F

## 2024-04-07 DIAGNOSIS — R03.0 ELEVATED BLOOD PRESSURE READING: ICD-10-CM

## 2024-04-07 DIAGNOSIS — J01.00 ACUTE NON-RECURRENT MAXILLARY SINUSITIS: Primary | ICD-10-CM

## 2024-04-07 PROCEDURE — 3079F DIAST BP 80-89 MM HG: CPT | Performed by: NURSE PRACTITIONER

## 2024-04-07 PROCEDURE — 99213 OFFICE O/P EST LOW 20 MIN: CPT | Performed by: NURSE PRACTITIONER

## 2024-04-07 PROCEDURE — 3008F BODY MASS INDEX DOCD: CPT | Performed by: NURSE PRACTITIONER

## 2024-04-07 PROCEDURE — 3077F SYST BP >= 140 MM HG: CPT | Performed by: NURSE PRACTITIONER

## 2024-04-07 RX ORDER — AMOXICILLIN 875 MG/1
875 TABLET, COATED ORAL 2 TIMES DAILY
Qty: 14 TABLET | Refills: 0 | Status: SHIPPED | OUTPATIENT
Start: 2024-04-07 | End: 2024-04-14

## 2024-04-07 NOTE — PROGRESS NOTES
CHIEF COMPLAINT:     Chief Complaint   Patient presents with    Sinus Problem     Entered by patient chronic sinus issues, x last couple of days headache, cough and facial pressure        HPI:   Sree Oliva is a 48 year old male who presents for upper respiratory symptoms for  1 weeks. Patient reports sinus pain, pressure in face/cheeks/forehead, PND, cough. Symptoms have been worsening since onset.  Treating symptoms with allegra, sinus rinse.      Current Outpatient Medications   Medication Sig Dispense Refill    amoxicillin 875 MG Oral Tab Take 1 tablet (875 mg total) by mouth 2 (two) times daily for 7 days. 14 tablet 0    PHENTERMINE HCL 37.5 MG Oral Tab TAKE ONE TABLET BY MOUTH BEFORE breakfast 30 tablet 2    Mometasone Furoate 50 MCG/ACT Nasal Suspension by Nasal route as needed.      methylPREDNISolone (MEDROL) 4 MG Oral Tablet Therapy Pack As directed. 1 each 0    ibuprofen 600 MG Oral Tab Take 1 tablet (600 mg total) by mouth as needed.        History reviewed. No pertinent past medical history.   History reviewed. No pertinent surgical history.      Social History     Socioeconomic History    Marital status:    Occupational History    Occupation: teacher   Tobacco Use    Smoking status: Never    Smokeless tobacco: Never   Vaping Use    Vaping Use: Never used   Substance and Sexual Activity    Alcohol use: Not Currently     Alcohol/week: 1.0 standard drink of alcohol     Types: 1 Glasses of wine per week     Comment: 2-3 drinks a month    Drug use: No    Sexual activity: Yes   Other Topics Concern     Service No    Caffeine Concern Yes     Comment: 3 cups coffee daily, Occ diet soda    Exercise No    Seat Belt Yes         REVIEW OF SYSTEMS:   GENERAL: intact appetite  SKIN: no rashes or abnormal skin lesions  HEENT: See HPI  LUNGS: See HPI  CARDIOVASCULAR: denies chest pain or palpitations   GI: denies N/V/C or abdominal pain      EXAM:   /86   Pulse 91   Temp 97.8 °F (36.6  °C) (Oral)   Resp 16   Ht 5' 10\" (1.778 m)   Wt 216 lb (98 kg)   SpO2 99%   BMI 30.99 kg/m²   GENERAL: well developed, well nourished,in no apparent distress  SKIN: no rashes,no suspicious lesions  HEAD: atraumatic, normocephalic.  + tenderness on palpation of frontal sinuses  EYES: conjunctiva clear, EOM intact  EARS: TM's grey, no bulging, no retraction, + fluid, bony landmarks partially obscured  NOSE: Nostrils patent, no nasal discharge, nasal mucosa + erythema   THROAT: Oral mucosa pink, moist. Posterior pharynx is not erythematous. no exudates. Tonsils WNL.    NECK: Supple, non-tender  LUNGS: clear to auscultation bilaterally, no wheezes or rhonchi. Breathing is non labored.  CARDIO: RRR without murmur  EXTREMITIES: no cyanosis, clubbing or edema  LYMPH:  no cervical lymphadenopathy.        ASSESSMENT AND PLAN:   Sree Oliva is a 48 year old male who presents with upper respiratory symptoms that are consistent with    ASSESSMENT:   Encounter Diagnoses   Name Primary?    Acute non-recurrent maxillary sinusitis Yes    Elevated blood pressure reading        PLAN: Meds as below.    Comfort care as described in Patient Instructions  Discussed elevated b/p reading, pt exercises 5x/week, advised watch salt in diet, check b/p at home and f/u with PCP if readings remain above 140/90    Meds & Refills for this Visit:  Requested Prescriptions     Signed Prescriptions Disp Refills    amoxicillin 875 MG Oral Tab 14 tablet 0     Sig: Take 1 tablet (875 mg total) by mouth 2 (two) times daily for 7 days.     Risks, benefits, and side effects of medication explained and discussed.    The patient indicates understanding of these issues and agrees to the plan.  The patient is asked to f/u with PCP if sx's persist or worsen.  There are no Patient Instructions on file for this visit.

## 2024-05-07 DIAGNOSIS — E66.9 OBESITY (BMI 30-39.9): ICD-10-CM

## 2024-05-07 RX ORDER — PHENTERMINE HYDROCHLORIDE 37.5 MG/1
37.5 TABLET ORAL
Qty: 30 TABLET | Refills: 2 | Status: CANCELLED | OUTPATIENT
Start: 2024-05-07

## 2024-05-08 NOTE — TELEPHONE ENCOUNTER
It's been 6 months since Office visit.       Return in about 3 months (around 2/20/2024) for weight management.

## 2024-05-18 ENCOUNTER — OFFICE VISIT (OUTPATIENT)
Dept: INTERNAL MEDICINE CLINIC | Facility: CLINIC | Age: 49
End: 2024-05-18

## 2024-05-18 VITALS
OXYGEN SATURATION: 98 % | TEMPERATURE: 97 F | WEIGHT: 221 LBS | SYSTOLIC BLOOD PRESSURE: 136 MMHG | BODY MASS INDEX: 32 KG/M2 | HEART RATE: 87 BPM | DIASTOLIC BLOOD PRESSURE: 82 MMHG

## 2024-05-18 DIAGNOSIS — M54.16 LUMBAR RADICULOPATHY: ICD-10-CM

## 2024-05-18 DIAGNOSIS — E66.9 CLASS 1 OBESITY: ICD-10-CM

## 2024-05-18 DIAGNOSIS — I83.90 VARICOSE VEINS: Primary | ICD-10-CM

## 2024-05-18 PROCEDURE — 3075F SYST BP GE 130 - 139MM HG: CPT | Performed by: INTERNAL MEDICINE

## 2024-05-18 PROCEDURE — 99214 OFFICE O/P EST MOD 30 MIN: CPT | Performed by: INTERNAL MEDICINE

## 2024-05-18 PROCEDURE — 3079F DIAST BP 80-89 MM HG: CPT | Performed by: INTERNAL MEDICINE

## 2024-05-18 RX ORDER — METHYLPREDNISOLONE 4 MG/1
TABLET ORAL
Qty: 1 EACH | Refills: 0 | Status: SHIPPED | OUTPATIENT
Start: 2024-05-18

## 2024-05-18 NOTE — PROGRESS NOTES
Sree Oliva  7/25/1975    Chief Complaint   Patient presents with    Leg Pain     Bilateral leg pain that starts from glutes and radiates down the legs   Limits physical activity and worsens with bending over        SUBJECTIVE   Sree Oliva is a 48 year old male who presents for evaluation.    The patient enjoys an active lifestyle and undergoes an exercise regimen most days of the week. Approximately two weeks ago he developed a sudden-onset of bilateral lower back pain with radiation into the bilateral buttocks and into the distal lower extremities. Symptoms are typically worse with lumbar spine flexion, and improve with standing and walking. Pain is described as a combination of burning, but also sharp and stabbing. No weakness, or urinary or fecal incontinence.     Review of Systems   No f/c/chest pain or sob. No cough. No abd pain/n/v/d. No ha or dizziness. No numbness, tingling, or weakness. No other complaints today.    Current Outpatient Medications   Medication Sig Dispense Refill    methylPREDNISolone (MEDROL) 4 MG Oral Tablet Therapy Pack As directed. 1 each 0    PHENTERMINE HCL 37.5 MG Oral Tab TAKE ONE TABLET BY MOUTH BEFORE breakfast (Patient not taking: Reported on 5/18/2024) 30 tablet 2    Mometasone Furoate 50 MCG/ACT Nasal Suspension by Nasal route as needed. (Patient not taking: Reported on 5/18/2024)        Allergies   Allergen Reactions    Dust     Grass     Melons ITCHING     Itching in throat.    P & S      Pollen and seasonal      No past medical history on file.   Patient Active Problem List   Diagnosis    Other allergy, other than to medicinal agents    Allergic rhinitis due to allergen    Hyperlipidemia    Morbid obesity with BMI of 40.0-44.9, adult (HCC)    Osteoarthritis of foot joint    Metatarsus adductus    Prediabetes    Therapeutic drug monitoring    Obesity with body mass index (BMI) of 35.0 to 39.9 without comorbidity      No past surgical history on file.   Social  History     Socioeconomic History    Marital status:    Occupational History    Occupation: teacher   Tobacco Use    Smoking status: Never    Smokeless tobacco: Never   Vaping Use    Vaping status: Never Used   Substance and Sexual Activity    Alcohol use: Not Currently     Alcohol/week: 1.0 standard drink of alcohol     Types: 1 Glasses of wine per week     Comment: 2-3 drinks a month    Drug use: No    Sexual activity: Yes   Other Topics Concern     Service No    Caffeine Concern Yes     Comment: 3 cups coffee daily, Occ diet soda    Exercise No    Seat Belt Yes         OBJECTIVE:   /82 (BP Location: Right arm, Patient Position: Sitting, Cuff Size: adult)   Pulse 87   Temp 96.9 °F (36.1 °C) (Temporal)   Wt 221 lb (100.2 kg)   SpO2 98%   BMI 31.71 kg/m²   Constitutional: Oriented to person, place, and time. No distress.   HEENT:  Normocephalic and atraumatic.  Cardiovascular: Normal rate, regular rhythm and intact distal pulses.  No murmur, rubs or gallops.   Pulmonary/Chest: Effort normal and breath sounds normal. No respiratory distress.  Abdominal: Soft. Bowel sounds are normal. Non tender, no masses, no organomegaly or hernias.  Musculoskeletal: No edema. No tenderness of lumbar paraspinal muscles.  Neurological: No gross defecits. Straight leg raise positive on left around 30 degrees.  Skin: Skin is warm and dry. No rash.  Psychiatric: Normal mood and affect.     ASSESSMENT AND PLAN:   Sree Oliva is a 48 year old male who presents for evaluation.    Lumbar radiculopathy:  No focal neurological deficits/myelopathy  Oral steroid therapy ordered  If symptoms persist will order xray of lumbar spine   Class 1 obesity: improving weight will improve compression onto lumbar spine    Left varicose veins:  Symptomatic   Referred to vascular surgery service    The patient indicates understanding of these issues and agrees to the plan.  TODAY'S ORDERS     No orders of the defined types  were placed in this encounter.      Meds & Refills:  Requested Prescriptions     Signed Prescriptions Disp Refills    methylPREDNISolone (MEDROL) 4 MG Oral Tablet Therapy Pack 1 each 0     Sig: As directed.       Imaging & Consults:  VASCULAR SURGERY - INTERNAL    No follow-ups on file.  There are no Patient Instructions on file for this visit.    All questions were answered and the patient agrees with the plan.     Thank you,  Michael Palomares MD

## 2024-06-04 ENCOUNTER — HOSPITAL ENCOUNTER (OUTPATIENT)
Dept: GENERAL RADIOLOGY | Facility: HOSPITAL | Age: 49
Discharge: HOME | End: 2024-06-04
Attending: INTERNAL MEDICINE
Payer: COMMERCIAL

## 2024-06-04 DIAGNOSIS — M48.061 SPINAL STENOSIS OF LUMBAR REGION, UNSPECIFIED WHETHER NEUROGENIC CLAUDICATION PRESENT: Primary | ICD-10-CM

## 2024-06-04 DIAGNOSIS — M47.816 FACET ARTHROPATHY, LUMBAR: ICD-10-CM

## 2024-06-04 DIAGNOSIS — M54.16 LUMBAR RADICULOPATHY: ICD-10-CM

## 2024-06-04 DIAGNOSIS — M51.36 DDD (DEGENERATIVE DISC DISEASE), LUMBAR: ICD-10-CM

## 2024-06-04 PROBLEM — M51.369 DDD (DEGENERATIVE DISC DISEASE), LUMBAR: Status: ACTIVE | Noted: 2024-06-04

## 2024-06-04 PROCEDURE — 72110 X-RAY EXAM L-2 SPINE 4/>VWS: CPT | Performed by: INTERNAL MEDICINE

## 2024-06-21 ENCOUNTER — OFFICE VISIT (OUTPATIENT)
Dept: PHYSICAL MEDICINE AND REHAB | Facility: CLINIC | Age: 49
End: 2024-06-21

## 2024-06-21 VITALS — HEIGHT: 70 IN | BODY MASS INDEX: 30.92 KG/M2 | WEIGHT: 216 LBS

## 2024-06-21 DIAGNOSIS — M48.061 SPINAL STENOSIS OF LUMBAR REGION, UNSPECIFIED WHETHER NEUROGENIC CLAUDICATION PRESENT: Primary | ICD-10-CM

## 2024-06-21 PROCEDURE — 3008F BODY MASS INDEX DOCD: CPT | Performed by: PHYSICAL MEDICINE & REHABILITATION

## 2024-06-21 PROCEDURE — 99204 OFFICE O/P NEW MOD 45 MIN: CPT | Performed by: PHYSICAL MEDICINE & REHABILITATION

## 2024-06-21 NOTE — PATIENT INSTRUCTIONS
If buttock/leg pain flares you may contact the clinic and I will order a course of meloxicam for you, and possibly physical therapy.

## 2024-06-21 NOTE — H&P
History and Physical    C/C:   Chief Complaint   Patient presents with    New Patient     New patient is here with complaints of back pain and was referred by Dr. Palomares. XR of lumbar spine was completed 6/4/24     HPI: This is a 48-year-old male with past medical history of obesity with a significant, over 100lb weight loss, otherwise healthy who presents with resolved buttock and posterior thigh pain radiating down to the knees.  Symptoms occurred about 6 weeks ago without a particular inciting event.  May have happened sometime after dead lifting.  He has been diligently working on weight loss, and has not lost 100 pounds through the weight loss clinic and through exercising with a .  He is  has been attempting to advance his exercise program.  When the buttock and posterior thigh pain occurred he was eventually seen by his primary care physician who prescribed him a course of prednisone.  Following the course of prednisone he had improvement, but some persistence of his symptoms.  An x-ray of the lumbar spine was ordered and he was referred to this clinic.  He has not had any significant buttock and posterior leg pain in  over 1 week.  There was a time when he hadto relieve  significant pain with walking, and while walking in [facility] had to stop and sit buttock and posterior thigh pain.  For the most part both legs were equally painful, with some rare instances of left greater than right buttock/leg pain.  When the symptoms initially started he thought he pulled a hamstring.  There has been no associated numbness or tingling.  He localizes lower back/buttock pain to the area of the PSIS bilaterally.    He is at a point where he has had minimal to no buttock and posterior thigh pain, and his main concern is how to move forward without provoking symptoms, and if there is activity he should not be doing.     Allergies:   Allergies   Allergen Reactions    Dust     Grass      Melons ITCHING     Itching in throat.    P & S      Pollen and seasonal      Pertinent review of systems: Denies fever, chills, unintended weight loss, night sweats, weakness, numbness, tingling, normal balance    Physical exam:  Ht 70\"   Wt 216 lb (98 kg)   BMI 30.99 kg/m²     Lumbar spine exam:    No skin rash lumbar/upper sacral region  No pain with lumbar flexion. No pain with lumbar extension. Mild but not significant decreased lumbar extension.  No tenderness to palpation bilateral lumbar paraspinals. No ttp bilateral PSIS.  5/5 LE strength b/l in HF, KE, ADF, EHL, ankle eversion  SILT b/l LE  2/4 quad, gastrocs reflexes b/l  Facet loading negative b/l  SLR negative b/l  No pain with hip flexion, internal rotation bilaterally    Imaging: XR lumbar spine dated 6/4/2024 independently reviewed, as was report. 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    Assessment and plan:  Bilateral lumbar radiculitis  Obesity/overweight, with significant intentional weight loss    He has been able to return to regular household activity, and has been attempting to modify exercise with his .  Congenital narrowing of the canal may be predisposing to radicular pain with primarily extension provoked activity.  I advised that he avoid activity that requires repetitive lumbar extension, refrain from using a barbell over the shoulders for the next 2 weeks, and keep overhead lifting light.  He should also use lower weights when squatting.  He may begin to reintroduce these activities if they do not provoke pain in 2 weeks time.  If the symptoms recur he is to contact the clinic and I will order physical therapy for neutral to flexion lumbar stabilization, and possibly a prescription for meloxicam 15 mg daily for 7 days, then daily on an as-needed basis afterwards.     I gave him a note of the following to provide with his .    Moe Gage DO  Physical Medicine and  [facility]

## 2024-09-03 ENCOUNTER — TELEPHONE (OUTPATIENT)
Dept: INTERNAL MEDICINE CLINIC | Facility: CLINIC | Age: 49
End: 2024-09-03

## 2024-09-09 PROBLEM — Z12.11 SPECIAL SCREENING FOR MALIGNANT NEOPLASM OF COLON: Status: ACTIVE | Noted: 2024-09-09

## 2024-09-26 ENCOUNTER — OFFICE VISIT (OUTPATIENT)
Dept: INTERNAL MEDICINE CLINIC | Facility: CLINIC | Age: 49
End: 2024-09-26
Payer: COMMERCIAL

## 2024-09-26 VITALS
RESPIRATION RATE: 16 BRPM | HEIGHT: 70 IN | WEIGHT: 238 LBS | BODY MASS INDEX: 34.07 KG/M2 | SYSTOLIC BLOOD PRESSURE: 120 MMHG | HEART RATE: 76 BPM | DIASTOLIC BLOOD PRESSURE: 78 MMHG

## 2024-09-26 DIAGNOSIS — R73.03 PREDIABETES: ICD-10-CM

## 2024-09-26 DIAGNOSIS — Z51.81 THERAPEUTIC DRUG MONITORING: Primary | ICD-10-CM

## 2024-09-26 DIAGNOSIS — E66.9 OBESITY (BMI 30-39.9): ICD-10-CM

## 2024-09-26 DIAGNOSIS — M51.369 DDD (DEGENERATIVE DISC DISEASE), LUMBAR: ICD-10-CM

## 2024-09-26 DIAGNOSIS — E78.5 HYPERLIPIDEMIA, UNSPECIFIED HYPERLIPIDEMIA TYPE: ICD-10-CM

## 2024-09-26 DIAGNOSIS — E55.9 VITAMIN D DEFICIENCY: ICD-10-CM

## 2024-09-26 PROCEDURE — 99214 OFFICE O/P EST MOD 30 MIN: CPT | Performed by: NURSE PRACTITIONER

## 2024-09-26 PROCEDURE — 3008F BODY MASS INDEX DOCD: CPT | Performed by: NURSE PRACTITIONER

## 2024-09-26 PROCEDURE — 3074F SYST BP LT 130 MM HG: CPT | Performed by: NURSE PRACTITIONER

## 2024-09-26 PROCEDURE — 3078F DIAST BP <80 MM HG: CPT | Performed by: NURSE PRACTITIONER

## 2024-09-26 RX ORDER — PHENTERMINE HYDROCHLORIDE 37.5 MG/1
37.5 TABLET ORAL
Qty: 30 TABLET | Refills: 2 | Status: SHIPPED | OUTPATIENT
Start: 2024-09-26

## 2024-09-26 NOTE — PATIENT INSTRUCTIONS
Next steps:  1.  Fill your prescribed medication and take as discussed and prescribed: phentermine  2.  Schedule a personal nutrition consultation with one of our registered dieticians  3. Can check with insurance on coverage for either wegovy or zepbound  Guerline direct- zepbound $399 vial   Or compound semaglutide      Please try to work on the following dietary changes:  Daily protein recommendation to start:   grams  Daily carbohydrate: <155g  Daily calories: 1,800-1,900  1.  Drink water with meals and throughout the day, cut down on soda and/or juice if consumed. Consider flavored water options like Bubbly, Spindrift, Hint and Jordi.  2.  Eat breakfast daily and focus on having protein with each meal, examples include: greek yogurt, cottage cheese, hard boiled egg, whole grain toast with peanut butter.   3.  Reduce refined carbohydrates and sugars which includes items such as sweets, as well as rice, pasta, and bread and make sure to choose whole grain options when having them with just 1 serving per meal about the size of your inner palm.  4.  Consume non starchy veggies daily working towards making them a good 50% of your daily food intake. Add them to lunch and dinner consistently.  5.  Start a daily probiotic: VSL#3 is recommended, (order on line at www.vsl3.com). Take 1 capsule daily with water for 30 days, then reduce to 1 every other day (this will reduce the cost). Capsules can be left out for 2 weeks, but then must be refrigerated.      Please download ulices My Fitness Pal, LoseIt! Or Net Diary to monitor daily dietary intake and you will be able to see if you are eating the right amount of calories or too much or too little which would hinder weight loss. Additionally this will help to see your daily carbohydrate and protein intake. When you set the ulices up choose 1-2 lbs/week as a goal.  Keeping a paper food journal is an option as well to remain accountable for your choices- this is the start to  mindful eating! A low calorie diet has been consistently shown to support weight loss.     Continue or start exercising to help establish a routine. If not already exercising begin with 1 day and progress as able with long-term goal of 30 minutes 5 days a week at a minimum.     Meditation daily can help manage and control stress. Chronic stress can make weight loss difficult.  Exercising is one way to help with stress, but meditation using the CALM Lucero or another comparable alternative can be done in your home or place of work with little time commitment. This Lucero can also help work on behavior change and improve sleep. Check out the segment under Calm Masterclass and listen to The 4 Pillars of Health. A great way to begin learning about the foundation of lifestyle with practical tips to use in your every day.     Check out www.yourweightmatters.org blog for continued daily support and education along this weight loss journey!    Patient Resources:     Personal Training/Fitness Classes/Health Coaching     [facility] @ [email or web address] Full fitness center with group fitness and personal training. Discount available as client of [facility].  Health Coaching and Personal Training with Onelia Johnson at our [facility]- individual weekly coaching with option to add personal training and small group fitness classes targeted at weight loss- [phone] and/or email @ [email or web address]  [email or web address] [place] [email or web address]. Group Fitness [phone] and/or email Shruti at [email or web address]  [facility] Fitness Centers with multiple locations: Memorop (www.Prediki Prediction Services), Eat The Frog Fitness (www.Taasera.Incentive), Fit Body Bootcamp (www.ForgeRockp.Incentive), Kal[employer] Medical (www.WALTOP), The Exercise  (www.exercisecoach.Incentive)     Online Fitness  Fitness  Arnoldo on Utube  Fit in 10 DVD series- www.rjqgi92CHM.com  Sit and Be Fit - Chair exercise series Www.sitandbefit.org  Hip Hop Fit with Hunter Simmons at www.hiphopfit.net     Apps for on the Go Fitness  Tonalea 7 Minute Workout (orange box with white 7) - free on the go HIIT training lucero  Peloton Lucero @ www.onepeloton.com     Nutrition Trackers and Tools  LoseIT! And My Fitness Pal apps and on line for tracking nutrition  NOOM - virtual health coaching  FitFoundation (healthy meals on the go) in [place] @ www.yutgpgdmmwvuy8yCreabilis  Bistro MD @ www.bistromd.com and Dmfdtd91 (keto and low carb plans recommended) @ www.wksyii26.com, Metabolic Meals @ www.MyMetabolicMeals.com - individual prepared meals to go  Gobble, Blue Apron, Home , Every Plate, Sunbasket- on line meal delivery programs for preparation at home  Meal Village in [place] for homemade meals to go @ www.[email or web address].Siperian  Diet Doctor @ www.dietdoctor.[place]perian - low carb swaps  YuMedicina - meal prep and planning lucero (www.yummly.com)     Stress Management/Behavior/Mindful Eating  CALM meditation lucero (www.calm.com)  Headspace  Am I Hungry? Mindful eating virtual  lucero  Www.yourweightmatters.org - Obesity Action Coalition sponsored Blog posts daily  Motivation lucero (black box with white \")- daily supportive messages sent to your phone     Books/Video Education/Podcasts  Mindless Eating by Rui Ochoa  Why We Get Sick by Mohit Mckeon (a book about insulin resistance)  Atomic Habits by Henok Guerra (a book about taking small steps to promote greater behavior change)   Can't Hurt Me by Dalton Lucero (a book exploring the power of discipline in achieving your goals)  The End of Dieting: How to Live for Life by Dr. Mk Wilkinson M.D. or listen to The [employer] Brainstorming Podcast Episode 63: Understanding \"Nutritarian\" Eating w/Dr. Mk Wilkinson  Your Body in Balance: The New Science of Food, Hormones, and Health by Dr. Anjum Valentin  The Menopause Diet Plan by Gege  Brien and Aleyda Tineo  The Complete Guide to fasting by Dr. Casas  Sugar, Salt & Fat by Yadira Brunson, Ph.D, R.D.  Weight Loss Surgery Will Not Treat Food Addiction by Alivia Duran Ph.D  The Game Changers- [employer] Documentary on plant based nutrition  Fed Up - documentary about obesity (Free on Utube)  The Truth About Sugar - documentary on sugar (Free on Utube, https://youtu.be/4C7mwteSH8z)  The Dr. Gutierrez T5 Wellness Plan by Dr. Nabil Gutierrez MD  Fitlosophy Fitspiration - journal to better health (found at [employer] in fitness aisle)  What Happened to You?- a look at the impact trauma has on behavior written by Jeremi Rasmussen and Dr. Royer Santizo  Whole Again by Wild Alford - discovering your true self after trauma  Jennifer Curran talk on [employer], The Call to Courage  Podcasts: The Exam Room by the Physician's Committee, Nutrition Facts by Dr. Richard    We are here to support you with weight loss, but please remember that you still need your primary care provider for your routine health maintenance.

## 2024-09-26 NOTE — PROGRESS NOTES
HISTORY OF PRESENT ILLNESS  Chief Complaint   Patient presents with    Weight Check     +27 last visit 11/20/2023     Sree Oliva is a 49 year old male here for follow up with medical weight loss program for the treatment of overweight, obesity, or morbid obesity.     Up #27 lbs (f/u from 11/2023)  Non-compliant on phentermine 37.5mg (ran out about 5/2024)   Tolerating well, helping with decreasing appetite and no side effects     Success: have tired to remain active and mindful of choices  Challenging: no longer losing weight. Gained some of it back  +sweet tooth  Still working out with - high intensity    Exercise/Activity: 4x/ week, via walking- cardio and strength   Nutrition: eating regular meals, +protein, minimal veggies. + tracking reports- MFP  Meals out per week on average: 4  Stress is manageable   Sleep: 6-6.5 hours/night, waking up feeling rested    Denies chest pain, shortness of breath, dizziness, blurred vision, headache, paresthesia, nausea/vomiting.     Breakfast Lunch Dinner Snacks Fluids   Reviewed              Wt Readings from Last 6 Encounters:   09/26/24 238 lb (108 kg)   09/09/24 230 lb (104.3 kg)   06/21/24 216 lb (98 kg)   05/18/24 221 lb (100.2 kg)   04/07/24 216 lb (98 kg)   11/20/23 211 lb (95.7 kg)          REVIEW OF SYSTEMS  GENERAL: feels well otherwise, denied any fevers chills or night sweats   LUNGS: denies shortness of breath  CARDIOVASCULAR: denies chest pain  GI: denies abdominal pain  MUSCULOSKELETAL: + back pain, joint pains   PSYCH: denies change in behavior or mood, denies feeling sad or depressed    EXAM  /78   Pulse 76   Resp 16   Ht 5' 10\" (1.778 m)   Wt 238 lb (108 kg)   BMI 34.15 kg/m²       GENERAL: well developed, well nourished, in no apparent distress, A/O x3  SKIN: no rashes, no suspicious lesions  HEENT: atraumatic, normocephalic, OP-clear, PERRLA  NECK: supple, no adenopathy  LUNGS: CTA in all fields, breathing non  labored  CARDIO: RRR without murmur  GI: +BS, NT/ND, no masses or HSM  EXTREMITIES: no cyanosis, no clubbing, no edema    Lab Results   Component Value Date     09/23/2023    BUN 16 09/23/2023    CREATSERUM 1.04 09/23/2023    ANIONGAP 5 08/28/2023    CA 9.6 09/23/2023    OSMOCALC 289 08/28/2023    ALKPHO 58 08/28/2023    AST 16 09/23/2023    ALT 14 09/23/2023    BILT 0.5 09/23/2023    TP 7.2 09/23/2023    ALB 3.2 (L) 08/28/2023    GLOBULIN 3.4 08/28/2023     08/28/2023    K 4.6 09/23/2023     09/23/2023    CO2 23 09/23/2023     Lab Results   Component Value Date     06/22/2022    A1C 5.7 09/23/2023     Lab Results   Component Value Date    CHOLEST 225 09/23/2023    TRIG 45 09/23/2023    HDL 73 09/23/2023    TCHDLRATIO 3.10 09/23/2023     No results found for: \"B12\", \"VITB12\"  No results found for: \"VITD\", \"QVITD\", \"JGUH81PH\"    No current outpatient medications on file prior to visit.     No current facility-administered medications on file prior to visit.       ASSESSMENT/PLAN    ICD-10-CM    1. Therapeutic drug monitoring  Z51.81       2. Obesity (BMI 30-39.9)  E66.9       3. Vitamin D deficiency  E55.9       4. Prediabetes  R73.03       5. Hyperlipidemia, unspecified hyperlipidemia type  E78.5           PLAN   Initial Weight Data and Goal Weight Loss:  Initial consult: #293 lbs on 9/2021  Weight Calculations  Initial Weight: 293 lbs  Initial Weight Date: 09/01/21  Today's Weight: 238 lbs  5% Goal: 14.65  10% Goal: 29.3  Total Weight Loss: 55 lbs  Total weight loss: up #27 lbs total, Net loss 55 lbs  Will restart medications: phentermine 37.5mg  Is going to check with insurance on coverage for GLP-1 meds (ie. Wegovy or zepbound)   --advised of side effects and adverse effects of this medication  Contradictions: avoid topamax (hx kidney stone), has done phentermine in the past, diethylpropion, metformin     Reviewed labs  Continue with vitamin d OTC   HLD  Stable, lifestyle education  given, follows with PCP   prediabetes, reviewed last A1c 5.7% on 9/2023 was previously 5.6% on 6/2022  Continue with physical activity and exercise- and strength training with    Wrote out macros and encouraged tracking   Nutrition: Low carb diet, recommended to eat breakfast daily/ regular protein intake  Follow up with dietitian and psychologist as recommended.  Discussed the role of sleep and stress in weight management.  Counseled on comprehensive weight loss plan including attention to nutrition, exercise and behavior/stress management for success. See patient instruction below for more details.  Discussed strategies to overcome barriers to successful weight loss and weight maintenance  FITTE: ACSM recommendations (150-300 minutes/ week in active weight loss)   Weight Loss Consent to treat reviewed and signed.    Total time spent on chart review, pre-charting, obtaining history, counseling, and educating, reviewing labs was 30 minutes.       NOTE TO PATIENT: The 21st Century Cures Act makes clinical notes like these available to patients in the interest of transparency. Clinical notes are medical documents used by physicians and care providers to communicate with each other. These documents include medical language and terminology, abbreviations, and treatment information that may sound technical and at times possibly unfamiliar. In addition, at times, the verbiage may appear blunt or direct. These documents are one tool providers use to communicate relevant information and clinical opinions of the care providers in a way that allows common understanding of the clinical context.     There are no Patient Instructions on file for this visit.    No follow-ups on file.    Patient verbalizes understanding.    Lorenza Calles, APRN

## 2025-01-22 ENCOUNTER — OFFICE VISIT (OUTPATIENT)
Dept: FAMILY MEDICINE CLINIC | Facility: CLINIC | Age: 50
End: 2025-01-22
Payer: COMMERCIAL

## 2025-01-22 VITALS
TEMPERATURE: 99 F | DIASTOLIC BLOOD PRESSURE: 76 MMHG | HEART RATE: 96 BPM | BODY MASS INDEX: 35 KG/M2 | RESPIRATION RATE: 18 BRPM | OXYGEN SATURATION: 98 % | WEIGHT: 243 LBS | SYSTOLIC BLOOD PRESSURE: 130 MMHG

## 2025-01-22 DIAGNOSIS — H10.33 ACUTE BACTERIAL CONJUNCTIVITIS OF BOTH EYES: Primary | ICD-10-CM

## 2025-01-22 DIAGNOSIS — J01.40 ACUTE PANSINUSITIS, RECURRENCE NOT SPECIFIED: ICD-10-CM

## 2025-01-22 PROCEDURE — 99214 OFFICE O/P EST MOD 30 MIN: CPT | Performed by: PHYSICIAN ASSISTANT

## 2025-01-22 PROCEDURE — 3078F DIAST BP <80 MM HG: CPT | Performed by: PHYSICIAN ASSISTANT

## 2025-01-22 PROCEDURE — 3075F SYST BP GE 130 - 139MM HG: CPT | Performed by: PHYSICIAN ASSISTANT

## 2025-01-22 RX ORDER — OFLOXACIN 3 MG/ML
SOLUTION/ DROPS OPHTHALMIC
Qty: 10 ML | Refills: 0 | Status: SHIPPED | OUTPATIENT
Start: 2025-01-22

## 2025-01-22 NOTE — PROGRESS NOTES
CHIEF COMPLAINT:     Chief Complaint   Patient presents with    Eye Problem     Possible pink eye. Redness and discharge - Entered by patient has been congested for a week, got new glasses on Sunday        HPI:   Sree Oliav is a 49 year old male who presents with chief complaint of bilateral eye redness that started yesterday.  Symptoms have been worsening since onset.   Patient reports + eye redness, +purulent discharge, denies itching, + eyelid/lash crusting.  Denies photophobia, pain with movement of eye, history of foreign body, foreign body sensation, change in vision, fever, history of allergies, or contact with irritant.  Treatments tried: None.   Patient wears contacts.     Patient also complains of nasal congestion, nasal drainage, sinus pressure, and post nasal drip for one week.    Current Outpatient Medications   Medication Sig Dispense Refill    amoxicillin clavulanate 875-125 MG Oral Tab Take 1 tablet by mouth 2 (two) times daily for 10 days. 20 tablet 0    ofloxacin 0.3 % Ophthalmic Solution 1-2 drops in both eyes every 4 hours for 2 days, then 4 times daily for 5 days 10 mL 0    Phentermine HCl 37.5 MG Oral Tab Take 1 tablet (37.5 mg total) by mouth every morning before breakfast. 30 tablet 2      Past Medical History:    Anxiety    Arthritis    Calculus of kidney    Decorative tattoo    High cholesterol    Hyperlipidemia    Stress    Wears glasses      History reviewed. No pertinent surgical history.   Family History   Problem Relation Age of Onset    Heart Attack Father     Heart Disease Father     Hypertension Father     Arthritis Maternal Grandmother     Diabetes Maternal Grandmother     Arthritis Mother     Diabetes Mother     Hypertension Mother       Social History     Socioeconomic History    Marital status:    Occupational History    Occupation: teacher   Tobacco Use    Smoking status: Never     Passive exposure: Never    Smokeless tobacco: Never   Vaping Use    Vaping  status: Never Used   Substance and Sexual Activity    Alcohol use: Yes     Alcohol/week: 3.0 standard drinks of alcohol     Types: 1 Glasses of wine, 1 Shots of liquor, 1 Standard drinks or equivalent per week     Comment: rarely have a drink at home - mostly socially/at dinner    Drug use: No    Sexual activity: Yes   Other Topics Concern     Service No    Caffeine Concern Yes     Comment: 3 cups coffee daily, Occ diet soda    Exercise No    Seat Belt Yes         REVIEW OF SYSTEMS:   GENERAL: feels well otherwise  SKIN: no rashes  EYES:denies blurred vision or double vision. See HPI  HENT: denies ear pain, congestion, sore throat  LUNGS: denies shortness of breath or cough  CARDIOVASCULAR: denies chest pain or palpitations   GI: denies N/V/C or abdominal pain  NEURO: denies headaches     EXAM:   /76   Pulse 96   Temp 98.6 °F (37 °C) (Oral)   Resp 18   Wt 243 lb (110.2 kg)   SpO2 98%   BMI 34.87 kg/m²   GENERAL: well developed, well nourished,in no apparent distress  SKIN: no rashes,no suspicious lesions  EYES: PERRLA, EOMI, Left conjunctiva +erythematous, +purulent discharge.  Right conjunctiva +purulent erythematous, + discharge.  Lid margins normal.   HENT: atraumatic, normocephalic,ears and throat are clear  NECK: supple, non tender  LUNGS: clear to auscultation bilaterally.   CARDIO: RRR without murmur  LYMPH: No preauricular lymphadenopathy. No cervical lymphadenopathy    ASSESSMENT AND PLAN:   Sree Oliva is a 49 year old male who presents with:    ASSESSMENT:   Encounter Diagnoses   Name Primary?    Acute bacterial conjunctivitis of both eyes Yes    Acute pansinusitis, recurrence not specified        PLAN: Hygeine and comfort care as listen in patient instructions.  Medication as listed below     Requested Prescriptions     Signed Prescriptions Disp Refills    amoxicillin clavulanate 875-125 MG Oral Tab 20 tablet 0     Sig: Take 1 tablet by mouth 2 (two) times daily for 10 days.     ofloxacin 0.3 % Ophthalmic Solution 10 mL 0     Si-2 drops in both eyes every 4 hours for 2 days, then 4 times daily for 5 days       Risks, benefits, complications and side effects of meds discussed.    Advised patient to avoid touching eyes.  Stressed importance of good handwashing as conjunctivitis is very contagious.  Warm compresses to affected eye prn.  Can return to work/school after on medication for 24 hours.    Patient Instructions   Augmentin  Ofloxacin  Follow up with PCP   If worse seek treatment        Call or return if not improved in 2-3 days.  The patient is asked to follow up with their PCP prn.

## 2025-01-26 DIAGNOSIS — E66.9 OBESITY (BMI 30-39.9): ICD-10-CM

## 2025-01-26 DIAGNOSIS — Z51.81 THERAPEUTIC DRUG MONITORING: ICD-10-CM

## 2025-01-27 RX ORDER — PHENTERMINE HYDROCHLORIDE 37.5 MG/1
37.5 TABLET ORAL
Qty: 30 TABLET | Refills: 1 | Status: SHIPPED | OUTPATIENT
Start: 2025-01-27

## 2025-01-27 NOTE — TELEPHONE ENCOUNTER
Requesting   Requested Prescriptions     Pending Prescriptions Disp Refills    PHENTERMINE HCL 37.5 MG Oral Tab [Pharmacy Med Name: phentermine 37.5 mg tablet] 30 tablet 2     Sig: TAKE ONE TABLET BY MOUTH EVERY MORNING BEFORE BREAKFAST       LOV: 09/26/2024  RTC: n/a  Filled: 09/26/2024 #30 with 2 refills    Future Appointments   Date Time Provider Department Center   2/24/2025  4:00 PM Lorenza Calles APRN [facility]   5/12/2025  3:40 PM Lorenza Calles APRN [facility]

## 2025-05-12 ENCOUNTER — OFFICE VISIT (OUTPATIENT)
Dept: INTERNAL MEDICINE CLINIC | Facility: CLINIC | Age: 50
End: 2025-05-12
Payer: COMMERCIAL

## 2025-05-12 VITALS
BODY MASS INDEX: 35.5 KG/M2 | DIASTOLIC BLOOD PRESSURE: 82 MMHG | OXYGEN SATURATION: 95 % | HEART RATE: 89 BPM | RESPIRATION RATE: 18 BRPM | HEIGHT: 70 IN | SYSTOLIC BLOOD PRESSURE: 128 MMHG | WEIGHT: 248 LBS

## 2025-05-12 DIAGNOSIS — E55.9 VITAMIN D DEFICIENCY: ICD-10-CM

## 2025-05-12 DIAGNOSIS — Z51.81 THERAPEUTIC DRUG MONITORING: Primary | ICD-10-CM

## 2025-05-12 DIAGNOSIS — R73.03 PREDIABETES: ICD-10-CM

## 2025-05-12 DIAGNOSIS — E78.5 HYPERLIPIDEMIA, UNSPECIFIED HYPERLIPIDEMIA TYPE: ICD-10-CM

## 2025-05-12 DIAGNOSIS — E66.9 OBESITY (BMI 30-39.9): ICD-10-CM

## 2025-05-12 PROCEDURE — 99214 OFFICE O/P EST MOD 30 MIN: CPT | Performed by: NURSE PRACTITIONER

## 2025-05-12 PROCEDURE — 3008F BODY MASS INDEX DOCD: CPT | Performed by: NURSE PRACTITIONER

## 2025-05-12 PROCEDURE — 3074F SYST BP LT 130 MM HG: CPT | Performed by: NURSE PRACTITIONER

## 2025-05-12 PROCEDURE — 3079F DIAST BP 80-89 MM HG: CPT | Performed by: NURSE PRACTITIONER

## 2025-05-12 RX ORDER — SEMAGLUTIDE 0.68 MG/ML
1 INJECTION, SOLUTION SUBCUTANEOUS WEEKLY
Qty: 1 EACH | Refills: 12 | COMMUNITY
Start: 2025-05-12

## 2025-05-12 RX ORDER — PHENTERMINE HYDROCHLORIDE 37.5 MG/1
37.5 TABLET ORAL
Qty: 30 TABLET | Refills: 3 | Status: SHIPPED | OUTPATIENT
Start: 2025-05-12

## 2025-05-12 NOTE — PROGRESS NOTES
HISTORY OF PRESENT ILLNESS  Chief Complaint   Patient presents with    Weight Check     Up  10 lb(9/26/2024)     Sree Oliva is a 49 year old male here for follow up with medical weight loss program for the treatment of overweight, obesity, or morbid obesity.     Up #10 lbs f/u from 9/2024  Non-compliant on phentermine 37.5mg (ran out around march 2025)  Tolerating well, helping with decreasing appetite and no side effects     Feels like he has been more active   Has been the same weight for the past 2 months and is frustrated- since he feels like he has been eating healthy and exercising   Trainer has adjusted workouts   Has 2 weeks left of the school year  Success: I continue to be active as I can with work schedule, even adding weekly workouts in the early morning  Challenging: I have stuck at about the same weight for several months now despite make many good choices   Exercise/Activity: 6x/ week, via walking- cardio and strength (with )   Nutrition: eating regular meals, +protein, minimal veggies. + tracking reports- MFP  Meals out per week on average: 2  Stress is manageable- 8/10   Sleep: 7 hours/night, waking up feeling rested    Denies chest pain, shortness of breath, dizziness, blurred vision, headache, paresthesia, nausea/vomiting.     Breakfast Lunch Dinner Snacks Fluids   Reviewed              Wt Readings from Last 6 Encounters:   05/12/25 248 lb (112.5 kg)   01/22/25 243 lb (110.2 kg)   09/26/24 238 lb (108 kg)   09/09/24 230 lb (104.3 kg)   06/21/24 216 lb (98 kg)   05/18/24 221 lb (100.2 kg)          REVIEW OF SYSTEMS  GENERAL: feels well otherwise, denied any fevers chills or night sweats   LUNGS: denies shortness of breath  CARDIOVASCULAR: denies chest pain  GI: denies abdominal pain  MUSCULOSKELETAL: + back pain, joint pains   PSYCH: denies change in behavior or mood, denies feeling sad or depressed    EXAM  /82   Pulse 89   Resp 18   Ht 5' 10\" (1.778 m)   Wt 248  lb (112.5 kg)   SpO2 95%   BMI 35.58 kg/m²       GENERAL: well developed, well nourished, in no apparent distress, A/O x3  SKIN: no rashes, no suspicious lesions  HEENT: atraumatic, normocephalic, OP-clear, PERRLA  NECK: supple, no adenopathy  LUNGS: CTA in all fields, breathing non labored  CARDIO: RRR without murmur  GI: +BS, NT/ND, no masses or HSM  EXTREMITIES: no cyanosis, no clubbing, no edema    Lab Results   Component Value Date     09/23/2023    BUN 16 09/23/2023    CREATSERUM 1.04 09/23/2023    ANIONGAP 5 08/28/2023    CA 9.6 09/23/2023    OSMOCALC 289 08/28/2023    ALKPHO 58 08/28/2023    AST 16 09/23/2023    ALT 14 09/23/2023    BILT 0.5 09/23/2023    TP 7.2 09/23/2023    ALB 3.2 (L) 08/28/2023    GLOBULIN 3.4 08/28/2023     08/28/2023    K 4.6 09/23/2023     09/23/2023    CO2 23 09/23/2023     Lab Results   Component Value Date     06/22/2022    A1C 5.7 09/23/2023     Lab Results   Component Value Date    CHOLEST 225 09/23/2023    TRIG 45 09/23/2023    HDL 73 09/23/2023    TCHDLRATIO 3.10 09/23/2023     No results found for: \"B12\", \"VITB12\"  No results found for: \"VITD\", \"QVITD\", \"HQVU62UD\"    Medications Ordered Prior to Encounter[1]    ASSESSMENT/PLAN    ICD-10-CM    1. Therapeutic drug monitoring  Z51.81       2. Obesity (BMI 30-39.9)  E66.9       3. Vitamin D deficiency  E55.9       4. Prediabetes  R73.03       5. Hyperlipidemia, unspecified hyperlipidemia type  E78.5           PLAN   Initial Weight Data and Goal Weight Loss:  Initial consult: #293 lbs on 9/2021  Weight Calculations  Initial Weight: 293 lbs  Initial Weight Date: 09/01/21  Today's Weight: 248 lbs  5% Goal: 14.65  10% Goal: 29.3  Total Weight Loss: 45 lbs  Total weight loss: up #10 lbs total, Net loss 45 lbs  restart medications: phentermine 37.5mg  Will give sample of medications: ozempic 0.25mg weekly (until runs out) and can think about adding back metformin   insurance on coverage for GLP-1 meds (ie.  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  Discussed the role of sleep and stress in weight management.  Counseled on comprehensive weight loss plan including attention to nutrition, exercise and behavior/stress management for success. See patient instruction below for more details.  Discussed strategies to overcome barriers to successful weight loss and weight maintenance  FITTE: ACSM recommendations (150-300 minutes/ week in active weight loss)   Weight Loss Consent to treat reviewed and signed.    Total time spent on chart review, pre-charting, obtaining history, counseling, and educating, reviewing labs was 30 minutes.       NOTE TO PATIENT: The 21st Century Cures Act makes clinical notes like these available to patients in the interest of transparency. Clinical notes are medical documents used by physicians and care providers to communicate with each other. These documents include medical language and terminology, abbreviations, and treatment information that may sound technical and at times possibly unfamiliar. In addition, at times, the verbiage may appear blunt or direct. These documents are one tool providers use to communicate relevant information and clinical opinions of the care providers in a way that allows common understanding of the clinical  context.     There are no Patient Instructions on file for this visit.    No follow-ups on file.    Patient verbalizes understanding.    LUCY Stanton             [1]   Current Outpatient Medications on File Prior to Visit   Medication Sig Dispense Refill    Phentermine HCl 37.5 MG Oral Tab TAKE ONE TABLET BY MOUTH EVERY MORNING BEFORE BREAKFAST (Patient not taking: Reported on 5/12/2025) 30 tablet 1    ofloxacin 0.3 % Ophthalmic Solution 1-2 drops in both eyes every 4 hours for 2 days, then 4 times daily for 5 days 10 mL 0     No current facility-administered medications on file prior to visit.

## 2025-05-12 NOTE — PATIENT INSTRUCTIONS
Next steps:  1.  Fill your prescribed medication and take as discussed and prescribed: phentermine 37.5mg and ozempic 0.25mg weekly x2 weeks and then increase ozempic 0.5mg weekly   2.  Schedule a personal nutrition consultation with one of our registered dieticians     Please try to work on the following dietary changes:  Daily protein recommendation to start:  grams  Daily carbohydrate: <160g  Daily calories: 1,900  1.  Drink water with meals and throughout the day, cut down on soda and/or juice if consumed. Consider flavored water options like Bubbly, Spindrift, Hint and Jordi.  2.  Eat breakfast daily and focus on having protein with each meal, examples include: greek yogurt, cottage cheese, hard boiled egg, whole grain toast with peanut butter.   3.  Reduce refined carbohydrates and sugars which includes items such as sweets, as well as rice, pasta, and bread and make sure to choose whole grain options when having them with just 1 serving per meal about the size of your inner palm.  4.  Consume non starchy veggies daily working towards making them a good 50% of your daily food intake. Add them to lunch and dinner consistently.  5.  Start a daily probiotic: VSL#3 is recommended, (order on line at www.vsl3.com). Take 1 capsule daily with water for 30 days, then reduce to 1 every other day (this will reduce the cost). Capsules can be left out for 2 weeks, but then must be refrigerated.      Please download ulices My Fitness Pal, LoseIt! Or Net Diary to monitor daily dietary intake and you will be able to see if you are eating the right amount of calories or too much or too little which would hinder weight loss. Additionally this will help to see your daily carbohydrate and protein intake. When you set the ulices up choose 1-2 lbs/week as a goal.  Keeping a paper food journal is an option as well to remain accountable for your choices- this is the start to mindful eating! A low calorie diet has been consistently  shown to support weight loss.     Continue or start exercising to help establish a routine. If not already exercising begin with 1 day and progress as able with long-term goal of 30 minutes 5 days a week at a minimum.     Meditation daily can help manage and control stress. Chronic stress can make weight loss difficult.  Exercising is one way to help with stress, but meditation using the CALM Lucero or another comparable alternative can be done in your home or place of work with little time commitment. This Lucero can also help work on behavior change and improve sleep. Check out the segment under Calm Masterclass and listen to The 4 Pillars of Health. A great way to begin learning about the foundation of lifestyle with practical tips to use in your every day.     Check out www.yourweightmatters.org blog for continued daily support and education along this weight loss journey!    Patient Resources:     Personal Training/Fitness Classes/Health Coaching     [facility] @ [email or web address]-center Full fitness center with group fitness and personal training. Discount available as client of [facility].  Health Coaching and Personal Training with Onelia Johnson at our [facility]- individual weekly coaching with option to add personal training and small group fitness classes targeted at weight loss- [phone] and/or email @ [email or web address]  [employer] [place] [email or web address] Group Fitness [phone] and/or email Shruti at [email or web address]  [facility] Fitness Centers with multiple locations: [employer] (www.Quinju.com), Eat The Frog Fitness (www.Altia.AMOtech), Fit Body Bootcamp (www.Mobi Riderbodybootcamp.com), Wildflower Health Fitness (www.Adaptive Technologies.AMOtech), The Exercise  (www.exercisecoach.AMOtech)     Online Fitness  Fitness  on joblocal  Fit in 10 DVD series-  www.qugpq80LHZ.The Totus Group  Sit and Be Fit - Chair exercise series Www.sitandbefit.org  Hip Hop Fit with Hunter Simmons at www.hiphopfit.net     Apps for on the Go Fitness  [place] 7 Minute Workout (orange box with white 7) - free on the go HIIT training lucero  Peloton Lucero @ www.onepeloton.com     Nutrition Trackers and Tools  LoseIT! And My Fitness Pal apps and on line for tracking nutrition  NOOM - virtual health coaching  FitFoundation (healthy meals on the go) in [place] @ www.ysdpkzerhvmpz6mSelecta Biosciences  Bisreinaldo ORTEZ @ wwwActive Optical MEMSbistromd.com and Dnfuyn14 (keto and low carb plans recommended) @ www.afnjns98.com, Metabolic Meals @ www.MyMetabolicMeals.com - individual prepared meals to go  Gobble, Blue Apron, Home , Every Plate, Sunbasket- on line meal delivery programs for preparation at home  Meal Village in [place] for homemade meals to go @ wwwActive Optical MEMSmealNeonga  Diet Doctor @ www.dietdoctor.com - low carb swaps  Yummly - meal prep and planning lucero (www.yummly.com)     Stress Management/Behavior/Mindful Eating  CALM meditation lucero (www.calm.com)  Headspace  Am I Hungry? Mindful eating virtual  lucero  Www.yourweightmatters.org - Obesity Action Coalition sponsored Blog posts daily  Motivation lucero (black box with white \")- daily supportive messages sent to your phone     Books/Video Education/Podcasts  Mindless Eating by Rui Ochoa  Why We Get Sick by Mohit Mckeon (a book about insulin resistance)  Atomic Habits by Henok Guerra (a book about taking small steps to promote greater behavior change)   Can't Hurt Me by Dalton Lucero (a book exploring the power of discipline in achieving your goals)  The End of Dieting: How to Live for Life by Dr. Mk Wilkinson M.D. or listen to The Cellectis Podcast Episode 63: Understanding \"Nutritarian\" Eating w/Dr. Mk Wilkinson  Your Body in Balance: The New Science of Food, Hormones, and Health by Dr. Anjum Valentin  The Menopause Diet Plan by Gege Tesfaye and Aleyda Tineo  The Complete  Guide to fasting by Dr. Casas  Sugar, Salt & Fat by Yadira Brunson, Ph.D, R.D.  Weight Loss Surgery Will Not Treat Food Addiction by Alivia Duran Ph.D  The Game Changers- Netflix Documentary on plant based nutrition  Fed Up - documentary about obesity (Free on Utube)  The Truth About Sugar - documentary on sugar (Free on Utube, https://youtu.be/1S4rzbeXG0y)  The Dr. Gutierrez T5 Wellness Plan by Dr. Nabil Gutierrez MD  Fitlosophy Fitspiration - journal to better health (found at [employer] in fitness aisle)  What Happened to You?- a look at the impact trauma has on behavior written by Jeremi Rasmussen and Dr. Royer Santizo  Whole Again by Wild Alford - discovering your true self after trauma  Jennifer Curran talk on Netflix, The Call to Courage  Podcasts: The Exam Room by the Physician's Committee, Nutrition Facts by Dr. Richard    We are here to support you with weight loss, but please remember that you still need your primary care provider for your routine health maintenance.

## (undated) DIAGNOSIS — R73.03 PREDIABETES: ICD-10-CM

## (undated) DIAGNOSIS — Z51.81 THERAPEUTIC DRUG MONITORING: ICD-10-CM

## (undated) DIAGNOSIS — E66.9 OBESITY WITH BODY MASS INDEX (BMI) OF 35.0 TO 39.9 WITHOUT COMORBIDITY: ICD-10-CM

## (undated) NOTE — Clinical Note
Down a total of #86 lbs! Sincerely, LUCY Pabon APRN, [facility] Obesity [facility] [address] [facility]  [facility] [address], [address] [facility], [place], [address]  [phone] [facility]. [facility]

## (undated) NOTE — LETTER
24      Sree Oliva  :  1975    To Whom It May Concern:    This patient was seen in our office on 24. From an exercise standpoint I suggest that he avoid exercises or stretches that require repetitive lumbar extension, and refrain from putting a barbell on his shoulders for the next 2-4 weeks. If going to lifting weights overhead he should keep the weights low. May squat at low weights as long as does not provoke pain.     Moe Gage, DO

## (undated) NOTE — LETTER
ASTHMA ACTION PLAN for Ramón Johnson     : 1975     Date: 2018  Provider:  Po Hurtado DO  Phone for doctor or clinic: [address] [facility], [address] [facility], [address] [address] [address] ,[address]  [facility] [address] [address]

## (undated) NOTE — LETTER
Date: 8/24/2020    Patient Name: Brain Henry          To Whom it may concern: This letter has been written at the patient's request. The above patient was seen at the [facility] for treatment of a medical condition.   It has been normal

## (undated) NOTE — LETTER
WHERE IS YOUR PAIN NOW?  Hugo the areas on your body where you feel the described sensations.  Use the appropriate symbol.  Hugo the areas of radiation.  Include all affected areas.  Just to complete the picture, please draw in the face.     ACHE:  ^ ^ ^   NUMBNESS:  0000   PINS & NEEDLES:  = = = =                              ^ ^ ^                       0000              = = = =                                    ^ ^ ^                       0000            = = = =      BURNING:  XXXX   STABBING: ////                  XXXX                ////                         XXXX          ////     Please hugo the line below indicating your degree of pain right now  with 0 being no pain 10 being the worst pain possible.                                         0             1             2              3             4              5              6              7             8             9             10         Patient Signature:

## (undated) NOTE — Clinical Note
Dr. Buster Headley,   Mr. Sammie Reeves is down a total of #61 lbs so far! Sincerely, LUCY Zavala APRN, [facility] Obesity Medicine [address] [facility]  [facility] [address], [address] [facility], [place], [address]   [phone] [facility] Reg[facility]. org

## (undated) NOTE — Clinical Note
Mr. Jai Briones is down #76 lbs total! Sincerely, LUCY Bella APRN, [facility]esity [facility] [address]  [facility] [address], [address] [facility], [place], [address]  [phone] Ella Kenyetta. org

## (undated) NOTE — MR AVS SNAPSHOT
[facility]  [address] [facility] [address] [phone]               Thank you for choosing us for your health care visit with Nan Snyder PA-C.   We are glad to serve you and happy to provide you with this summary of y Albuterol Sulfate  (90 Base) MCG/ACT Aers   Inhale 2 puffs into the lungs every 4 (four) hours as needed for Wheezing.    Commonly known as:  PROAIR HFA           benzonatate 100 MG Caps   Take 2 capsules (200 mg total) by mouth 3 (three) times anatoliy Educational Information     Healthy Diet and Regular Exercise  The Foundation of [facility] [address] for making healthy food choices  -   Enjoy your food, but eat less. Fully enjoy your food when eating. Don’t eat while distracted and slow down.    Avoid

## (undated) NOTE — LETTER
ASTHMA ACTION PLAN for Abimbola Urban     : 1975     Date: 2019  Provider:  Madeline Bryan PA-C  Phone for doctor or clinic: [address] [facility], [address] [facility], [address] [address] [address] ,[address]  [address] [address]

## (undated) NOTE — Clinical Note
Dr. Doroteo Mckeon,  Mr. Nelly Porter is down #55 lbs total!  Sincerely, LUCY Daigle APRN, [facility] Obesity Medicine [address] [facility] Weight Management  [facility] [address], [address] [facility], [place], [address]   [phone] [facility]. org